# Patient Record
Sex: MALE | URBAN - METROPOLITAN AREA
[De-identification: names, ages, dates, MRNs, and addresses within clinical notes are randomized per-mention and may not be internally consistent; named-entity substitution may affect disease eponyms.]

---

## 2021-04-30 PROBLEM — Z00.00 ENCOUNTER FOR PREVENTIVE HEALTH EXAMINATION: Status: ACTIVE | Noted: 2021-04-30

## 2023-01-31 ENCOUNTER — OFFICE (OUTPATIENT)
Dept: URBAN - METROPOLITAN AREA CLINIC 112 | Facility: CLINIC | Age: 78
Setting detail: OPHTHALMOLOGY
End: 2023-01-31
Payer: MEDICARE

## 2023-01-31 DIAGNOSIS — H43.393: ICD-10-CM

## 2023-01-31 DIAGNOSIS — H35.40: ICD-10-CM

## 2023-01-31 DIAGNOSIS — H52.4: ICD-10-CM

## 2023-01-31 DIAGNOSIS — H25.13: ICD-10-CM

## 2023-01-31 PROCEDURE — 92004 COMPRE OPH EXAM NEW PT 1/>: CPT | Performed by: OPHTHALMOLOGY

## 2023-01-31 PROCEDURE — 92250 FUNDUS PHOTOGRAPHY W/I&R: CPT | Performed by: OPHTHALMOLOGY

## 2023-01-31 PROCEDURE — 92015 DETERMINE REFRACTIVE STATE: CPT | Performed by: OPHTHALMOLOGY

## 2023-01-31 ASSESSMENT — REFRACTION_MANIFEST
OS_CYLINDER: -1.25
OS_AXIS: 106
OD_ADD: +2.50
OS_VA1: 20/20-1
OD_VA1: 20/60
OD_SPHERE: -2.25
OD_AXIS: 124
OD_CYLINDER: -1.25
OS_SPHERE: -2.25
OS_ADD: +2.50

## 2023-01-31 ASSESSMENT — REFRACTION_CURRENTRX
OD_OVR_VA: 20/
OD_AXIS: 124
OS_SPHERE: -3.00
OD_VPRISM_DIRECTION: PROGS
OS_AXIS: 106
OS_VPRISM_DIRECTION: PROGS
OD_SPHERE: -2.50
OS_ADD: +3.00
OD_ADD: +2.00
OD_CYLINDER: -1.50
OS_OVR_VA: 20/
OS_CYLINDER: -1.00

## 2023-01-31 ASSESSMENT — CONFRONTATIONAL VISUAL FIELD TEST (CVF)
OD_FINDINGS: FULL
OS_FINDINGS: FULL

## 2023-01-31 ASSESSMENT — REFRACTION_AUTOREFRACTION
OD_AXIS: 119
OS_AXIS: 104
OD_CYLINDER: -1.00
OS_CYLINDER: -1.25
OS_SPHERE: -2.25
OD_SPHERE: -2.25

## 2023-01-31 ASSESSMENT — SPHEQUIV_DERIVED
OD_SPHEQUIV: -2.875
OS_SPHEQUIV: -2.875
OD_SPHEQUIV: -2.75
OS_SPHEQUIV: -2.875

## 2023-01-31 ASSESSMENT — TONOMETRY
OD_IOP_MMHG: 15
OS_IOP_MMHG: 16

## 2023-03-23 ENCOUNTER — APPOINTMENT (OUTPATIENT)
Dept: UROLOGY | Facility: CLINIC | Age: 78
End: 2023-03-23

## 2023-03-25 ENCOUNTER — INPATIENT (INPATIENT)
Facility: HOSPITAL | Age: 78
LOS: 8 days | Discharge: NURSING, REHAB OR LONG-TERM CARE | DRG: 689 | End: 2023-04-03
Attending: FAMILY MEDICINE | Admitting: INTERNAL MEDICINE
Payer: MEDICARE

## 2023-03-25 VITALS
RESPIRATION RATE: 17 BRPM | SYSTOLIC BLOOD PRESSURE: 148 MMHG | OXYGEN SATURATION: 100 % | HEART RATE: 69 BPM | TEMPERATURE: 98 F | DIASTOLIC BLOOD PRESSURE: 65 MMHG

## 2023-03-25 DIAGNOSIS — I50.32 CHRONIC DIASTOLIC (CONGESTIVE) HEART FAILURE: ICD-10-CM

## 2023-03-25 DIAGNOSIS — I48.20 CHRONIC ATRIAL FIBRILLATION, UNSPECIFIED: ICD-10-CM

## 2023-03-25 DIAGNOSIS — G93.41 METABOLIC ENCEPHALOPATHY: ICD-10-CM

## 2023-03-25 DIAGNOSIS — L03.115 CELLULITIS OF RIGHT LOWER LIMB: ICD-10-CM

## 2023-03-25 DIAGNOSIS — I82.463 ACUTE EMBOLISM AND THROMBOSIS OF CALF MUSCULAR VEIN, BILATERAL: ICD-10-CM

## 2023-03-25 DIAGNOSIS — R91.1 SOLITARY PULMONARY NODULE: ICD-10-CM

## 2023-03-25 DIAGNOSIS — N13.6 PYONEPHROSIS: ICD-10-CM

## 2023-03-25 DIAGNOSIS — N18.9 CHRONIC KIDNEY DISEASE, UNSPECIFIED: ICD-10-CM

## 2023-03-25 DIAGNOSIS — F03.90 UNSPECIFIED DEMENTIA WITHOUT BEHAVIORAL DISTURBANCE: ICD-10-CM

## 2023-03-25 DIAGNOSIS — Z91.81 HISTORY OF FALLING: ICD-10-CM

## 2023-03-25 DIAGNOSIS — I35.0 NONRHEUMATIC AORTIC (VALVE) STENOSIS: ICD-10-CM

## 2023-03-25 DIAGNOSIS — Z79.01 LONG TERM (CURRENT) USE OF ANTICOAGULANTS: ICD-10-CM

## 2023-03-25 DIAGNOSIS — N39.0 URINARY TRACT INFECTION, SITE NOT SPECIFIED: ICD-10-CM

## 2023-03-25 DIAGNOSIS — E43 UNSPECIFIED SEVERE PROTEIN-CALORIE MALNUTRITION: ICD-10-CM

## 2023-03-25 DIAGNOSIS — L60.3 NAIL DYSTROPHY: ICD-10-CM

## 2023-03-25 DIAGNOSIS — I24.8 OTHER FORMS OF ACUTE ISCHEMIC HEART DISEASE: ICD-10-CM

## 2023-03-25 DIAGNOSIS — B35.1 TINEA UNGUIUM: ICD-10-CM

## 2023-03-25 PROCEDURE — 87635 SARS-COV-2 COVID-19 AMP PRB: CPT

## 2023-03-25 PROCEDURE — 84100 ASSAY OF PHOSPHORUS: CPT

## 2023-03-25 PROCEDURE — 76770 US EXAM ABDO BACK WALL COMP: CPT

## 2023-03-25 PROCEDURE — 74176 CT ABD & PELVIS W/O CONTRAST: CPT

## 2023-03-25 PROCEDURE — 99285 EMERGENCY DEPT VISIT HI MDM: CPT

## 2023-03-25 PROCEDURE — 93970 EXTREMITY STUDY: CPT

## 2023-03-25 PROCEDURE — 86803 HEPATITIS C AB TEST: CPT

## 2023-03-25 PROCEDURE — 85027 COMPLETE CBC AUTOMATED: CPT

## 2023-03-25 PROCEDURE — 36415 COLL VENOUS BLD VENIPUNCTURE: CPT

## 2023-03-25 PROCEDURE — 0241U: CPT

## 2023-03-25 PROCEDURE — 93010 ELECTROCARDIOGRAM REPORT: CPT

## 2023-03-25 PROCEDURE — 71045 X-RAY EXAM CHEST 1 VIEW: CPT | Mod: 26

## 2023-03-25 PROCEDURE — 83735 ASSAY OF MAGNESIUM: CPT

## 2023-03-25 PROCEDURE — 80053 COMPREHEN METABOLIC PANEL: CPT

## 2023-03-25 PROCEDURE — 85014 HEMATOCRIT: CPT

## 2023-03-25 PROCEDURE — 97116 GAIT TRAINING THERAPY: CPT | Mod: GP

## 2023-03-25 PROCEDURE — 85025 COMPLETE CBC W/AUTO DIFF WBC: CPT

## 2023-03-25 PROCEDURE — 80048 BASIC METABOLIC PNL TOTAL CA: CPT

## 2023-03-25 PROCEDURE — 85610 PROTHROMBIN TIME: CPT

## 2023-03-25 PROCEDURE — 97530 THERAPEUTIC ACTIVITIES: CPT | Mod: GP

## 2023-03-25 PROCEDURE — 85018 HEMOGLOBIN: CPT

## 2023-03-25 PROCEDURE — 93306 TTE W/DOPPLER COMPLETE: CPT

## 2023-03-25 PROCEDURE — 83880 ASSAY OF NATRIURETIC PEPTIDE: CPT

## 2023-03-25 PROCEDURE — 80202 ASSAY OF VANCOMYCIN: CPT

## 2023-03-25 PROCEDURE — 97162 PT EVAL MOD COMPLEX 30 MIN: CPT | Mod: GP

## 2023-03-25 PROCEDURE — 70450 CT HEAD/BRAIN W/O DYE: CPT | Mod: 26

## 2023-03-25 PROCEDURE — 71045 X-RAY EXAM CHEST 1 VIEW: CPT

## 2023-03-25 RX ORDER — LEVOTHYROXINE SODIUM 125 MCG
1 TABLET ORAL
Refills: 0 | DISCHARGE

## 2023-03-25 RX ORDER — SPIRONOLACTONE 25 MG/1
1 TABLET, FILM COATED ORAL
Refills: 0 | DISCHARGE

## 2023-03-25 RX ORDER — FUROSEMIDE 40 MG
1 TABLET ORAL
Refills: 0 | DISCHARGE

## 2023-03-25 RX ORDER — WARFARIN SODIUM 2.5 MG/1
1 TABLET ORAL
Refills: 0 | DISCHARGE

## 2023-03-25 NOTE — PHARMACOTHERAPY INTERVENTION NOTE - COMMENTS
Medication history complete. Medications and allergies reviewed with patient's daughter at bedside and confirmed with .

## 2023-03-26 LAB
ALBUMIN SERPL ELPH-MCNC: 3.4 G/DL — SIGNIFICANT CHANGE UP (ref 3.3–5)
ALP SERPL-CCNC: 88 U/L — SIGNIFICANT CHANGE UP (ref 40–120)
ALT FLD-CCNC: 55 U/L — SIGNIFICANT CHANGE UP (ref 12–78)
ANION GAP SERPL CALC-SCNC: 5 MMOL/L — SIGNIFICANT CHANGE UP (ref 5–17)
AST SERPL-CCNC: 39 U/L — HIGH (ref 15–37)
BILIRUB SERPL-MCNC: 0.9 MG/DL — SIGNIFICANT CHANGE UP (ref 0.2–1.2)
BUN SERPL-MCNC: 101 MG/DL — HIGH (ref 7–23)
CALCIUM SERPL-MCNC: 8.8 MG/DL — SIGNIFICANT CHANGE UP (ref 8.5–10.1)
CHLORIDE SERPL-SCNC: 103 MMOL/L — SIGNIFICANT CHANGE UP (ref 96–108)
CO2 SERPL-SCNC: 31 MMOL/L — SIGNIFICANT CHANGE UP (ref 22–31)
CREAT SERPL-MCNC: 1.82 MG/DL — HIGH (ref 0.5–1.3)
EGFR: 38 ML/MIN/1.73M2 — LOW
FLUAV AG NPH QL: SIGNIFICANT CHANGE UP
FLUBV AG NPH QL: SIGNIFICANT CHANGE UP
GLUCOSE SERPL-MCNC: 172 MG/DL — HIGH (ref 70–99)
HCT VFR BLD CALC: 41.7 % — SIGNIFICANT CHANGE UP (ref 39–50)
HGB BLD-MCNC: 13.7 G/DL — SIGNIFICANT CHANGE UP (ref 13–17)
INR BLD: 2.33 RATIO — HIGH (ref 0.88–1.16)
MCHC RBC-ENTMCNC: 27.5 PG — SIGNIFICANT CHANGE UP (ref 27–34)
MCHC RBC-ENTMCNC: 32.9 GM/DL — SIGNIFICANT CHANGE UP (ref 32–36)
MCV RBC AUTO: 83.6 FL — SIGNIFICANT CHANGE UP (ref 80–100)
PLATELET # BLD AUTO: 155 K/UL — SIGNIFICANT CHANGE UP (ref 150–400)
POTASSIUM SERPL-MCNC: 3.6 MMOL/L — SIGNIFICANT CHANGE UP (ref 3.5–5.3)
POTASSIUM SERPL-SCNC: 3.6 MMOL/L — SIGNIFICANT CHANGE UP (ref 3.5–5.3)
PROT SERPL-MCNC: 6.8 GM/DL — SIGNIFICANT CHANGE UP (ref 6–8.3)
PROTHROM AB SERPL-ACNC: 27.3 SEC — HIGH (ref 10.5–13.4)
RBC # BLD: 4.99 M/UL — SIGNIFICANT CHANGE UP (ref 4.2–5.8)
RBC # FLD: 14.1 % — SIGNIFICANT CHANGE UP (ref 10.3–14.5)
RSV RNA NPH QL NAA+NON-PROBE: SIGNIFICANT CHANGE UP
SARS-COV-2 RNA SPEC QL NAA+PROBE: SIGNIFICANT CHANGE UP
SODIUM SERPL-SCNC: 139 MMOL/L — SIGNIFICANT CHANGE UP (ref 135–145)
WBC # BLD: 19.47 K/UL — HIGH (ref 3.8–10.5)
WBC # FLD AUTO: 19.47 K/UL — HIGH (ref 3.8–10.5)

## 2023-03-26 PROCEDURE — 12345: CPT | Mod: NC,GC

## 2023-03-26 PROCEDURE — 93970 EXTREMITY STUDY: CPT | Mod: 26

## 2023-03-26 PROCEDURE — 99222 1ST HOSP IP/OBS MODERATE 55: CPT

## 2023-03-26 RX ORDER — WARFARIN SODIUM 2.5 MG/1
2.5 TABLET ORAL DAILY
Refills: 0 | Status: DISCONTINUED | OUTPATIENT
Start: 2023-03-26 | End: 2023-03-28

## 2023-03-26 RX ORDER — ONDANSETRON 8 MG/1
4 TABLET, FILM COATED ORAL EVERY 8 HOURS
Refills: 0 | Status: DISCONTINUED | OUTPATIENT
Start: 2023-03-26 | End: 2023-04-03

## 2023-03-26 RX ORDER — LEVOTHYROXINE SODIUM 125 MCG
50 TABLET ORAL DAILY
Refills: 0 | Status: DISCONTINUED | OUTPATIENT
Start: 2023-03-26 | End: 2023-04-03

## 2023-03-26 RX ORDER — CEFTRIAXONE 500 MG/1
1000 INJECTION, POWDER, FOR SOLUTION INTRAMUSCULAR; INTRAVENOUS EVERY 24 HOURS
Refills: 0 | Status: DISCONTINUED | OUTPATIENT
Start: 2023-03-26 | End: 2023-03-26

## 2023-03-26 RX ORDER — ACETAMINOPHEN 500 MG
650 TABLET ORAL EVERY 6 HOURS
Refills: 0 | Status: DISCONTINUED | OUTPATIENT
Start: 2023-03-26 | End: 2023-04-03

## 2023-03-26 RX ORDER — CEFTRIAXONE 500 MG/1
1000 INJECTION, POWDER, FOR SOLUTION INTRAMUSCULAR; INTRAVENOUS EVERY 24 HOURS
Refills: 0 | Status: DISCONTINUED | OUTPATIENT
Start: 2023-03-26 | End: 2023-04-03

## 2023-03-26 RX ORDER — LANOLIN ALCOHOL/MO/W.PET/CERES
3 CREAM (GRAM) TOPICAL AT BEDTIME
Refills: 0 | Status: DISCONTINUED | OUTPATIENT
Start: 2023-03-26 | End: 2023-04-03

## 2023-03-26 RX ORDER — FUROSEMIDE 40 MG
40 TABLET ORAL ONCE
Refills: 0 | Status: COMPLETED | OUTPATIENT
Start: 2023-03-26 | End: 2023-03-26

## 2023-03-26 RX ADMIN — WARFARIN SODIUM 2.5 MILLIGRAM(S): 2.5 TABLET ORAL at 21:56

## 2023-03-26 RX ADMIN — CEFTRIAXONE 1000 MILLIGRAM(S): 500 INJECTION, POWDER, FOR SOLUTION INTRAMUSCULAR; INTRAVENOUS at 00:31

## 2023-03-26 RX ADMIN — Medication 3 MILLIGRAM(S): at 23:48

## 2023-03-26 RX ADMIN — Medication 50 MICROGRAM(S): at 05:28

## 2023-03-26 RX ADMIN — Medication 40 MILLIGRAM(S): at 01:48

## 2023-03-26 NOTE — ED ADULT NURSE NOTE - NSICDXNOFAMILYHX_GEN_ALL_CORE
Called and spoke to [facility] in lab who is aware of add-on urine orders and who states Protime-INR already completed and resulted, does not need redrawn.       Estevan Dhillon RN  07/08/20 1236 <-- Click to add NO pertinent Family History

## 2023-03-26 NOTE — H&P ADULT - NSHPPHYSICALEXAM_GEN_ALL_CORE
T(C): --97.9 F  HR: --69  BP: --148/65  RR: --17  SpO2: --100 % in RA    CONSTITUTIONAL no apparent distress, well developed.   EYES: PERRLA and symmetric, EOMI, No conjunctival or scleral injection, non-icteric  ENMT: Oral mucosa with moist membranes. Normal dentition; no pharyngeal injection or exudates             NECK: Supple, symmetric and without tracheal deviation   RESP: No respiratory distress, no use of accessory muscles; CTA b/l, no WRR  CV: RRR, +S1S2, no MRG; no JVD; ++ peripheral edema  GI: Soft, NT, ND, no rebound, no guarding; no palpable masses; no hepatosplenomegaly; no hernia palpated  LYMPH: No cervical LAD or tenderness.   MSK: Normal ROM without pain, no spinal tenderness, normal muscle strength/tone  SKIN: No rashes or ulcers noted; no subcutaneous nodules or induration palpable  NEURO: CN II-XII intact; normal reflexes in upper and lower extremities, sensation intact in upper and lower extremities b/l to light touch   PSYCH: Appropriate insight/judgment; A+O x 3, mood and affect appropriate, recent/remote memory intact
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
## 2023-03-26 NOTE — H&P ADULT - HISTORY OF PRESENT ILLNESS
76 yo Male presented with change in mental status, weakness and increased urination for 3-4 days. Patient has dementia so he is not a good historian. Daughter at the bedside provided the history. As per the Daughter, patient lives alone. She went to check on him last 2-3 days and found out he was getting more confuse and weak over the last few days. He was not taking his medications  regularly and was not able to control his urine. He was also having difficulty to move around. So yesterday daughter went to see him again and called 911 as his condition continued to get worse. No recent travel and no sick contact.  As per the daughter his legs are edematous. in last 2-3 days as he is not taking his meds regularly.     PMHx:  CKD  CHF  A fib  Dementia     PSHx:  No recent surgeries.
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
## 2023-03-26 NOTE — H&P ADULT - REASON FOR ADMISSION
Change in mental status Pt here for evaluation of right thumb / cuticle infection. States he was trimming nails and worked on cuticle. Area has been getting red, swollen, and drainage today when pressed. Last Td was 11/2020  Visit Vitals  /78   Pulse (!) 59   Temp 97.8 °F (36.6 °C) (Tympanic)   Resp 18   SpO2 98%

## 2023-03-26 NOTE — PROGRESS NOTE ADULT - ATTENDING COMMENTS
77 year old male with a PMH of CHF, CKD, Afib and Dementia presented with change in mental status, weakness and increased urination for 3-4 days.       #RLE edema and erythema  #b/l LE DVTs  -Pt with history of CHF with worsening lower extremity edema  -no SOB or chest pain  -On furosemide 40 daily at home and spironolactone  -s/p IV lasix 40 on admission  -On physical exam: + RLE erythema, 2+ pitting edema with wrinkling with b/l lower extremity TTP, +onychomycosis, suspect RLE cellulitis  -On IV ceftriaxone, will monitor for worsening symptoms   -WBC 17.92 > 19.47, negative SIRS; lactacte WNL  -US doppler done for r/o of DVT: + extensive occlusive DVTs in both calves with b/l superficial venous thrombosis in greater saphenous   -Patient on outpatient coumadin; compliance unknown, will f/u with daughter (varun) for more information tomorrow  -Following with Cardiologist Dr. Nesbitt as outpatient  -c/w Coumadin 2.5 mg QD; Monitor INR levels  -Vascular consult placed  -TTE pending   -daily weights  -Strict I&Os

## 2023-03-26 NOTE — H&P ADULT - ASSESSMENT
A/P:    1.  AMS  UTI  -started on IV ceftriaxone  -follow clinically and cultures    2.  h/o CHF  -worsening leg edema  -patient was not compliant with meds  -will give IV lasix 40 mg one dose now  -will follow Echo  -follow clinically and Cr level to give further dose of diuretics  -daily weight  -intake and output measure    3.  h/o CKD  -follow Cr level    4.  h/o A fib  -HR controlled  -continue Coumadin  -follow INR and adjust meds    5.  Coumadin for DVT ppx    6.  Code status; Full code.  A/P:    1.  AMS  UTI  -started on IV ceftriaxone  -follow clinically and cultures    2.  h/o CHF  -no SOB, no chest pain   -worsening leg edema  -patient was not compliant with meds  -will give IV lasix 40 mg one dose now  -will follow Echo  -follow clinically and Cr level to give further dose of diuretics  -daily weight  -intake and output measure    3.  h/o CKD  -follow Cr level    4.  h/o A fib  -HR controlled  -continue Coumadin  -follow INR and adjust meds    5.  Coumadin for DVT ppx    6.  Code status; Full code.

## 2023-03-26 NOTE — CONSULT NOTE ADULT - SUBJECTIVE AND OBJECTIVE BOX
Date of service: 3/26/23  HPI: 77y year old Male seen at bedside with a chief complaint of  painful thickened, elongated and dystrophic toenails digits 1-5 bilaterally and preventative foot examination. Patient is medically managed  by Medicine/Hospitalists.  Patient denies any history of trauma to both feet.  Patient denies any additional pedal complaints at this time.  Patient is experiencing pain while standing, walking and in shoe gear.     ROS:  Patient admits to  (-) Fevers, (-) Chills, (-) Nausea, (-) Vomiting, (-) Shortness of Breath (-) calf pain (-) chest pain       PMH:   No pertinent past medical history      PSH:    Allergies:No Known Allergies      Labs:                        13.7   19.47 )-----------( 155      ( 26 Mar 2023 08:46 )             41.7       WBC Trend  19.47<H> Date (03-26 @ 08:46)  17.92<H> Date (03-25 @ 22:15)      Chem  03-26    139  |  103  |  101<H>  ----------------------------<  172<H>  3.6   |  31  |  1.82<H>    Ca    8.8      26 Mar 2023 08:46    TPro  6.8  /  Alb  3.4  /  TBili  0.9  /  DBili  x   /  AST  39<H>  /  ALT  55  /  AlkPhos  88  03-26        Vitals:  T(F): 97.3 (03-26-23 @ 14:11), Max: 98.4 (03-26-23 @ 01:06)  HR: 77 (03-26-23 @ 14:11) (69 - 84)  BP: 135/78 (03-26-23 @ 14:11) (126/76 - 148/65)  RR: 17 (03-26-23 @ 14:11) (15 - 19)  SpO2: 100% (03-26-23 @ 14:11) (99% - 100%)  Wt(kg): --    Physical Examination:  Constitutional: Alert, oriented x3, in NAD.  Focused LE exam:  Integument:  Skin warm, dry and supple bilateral.  No open lesions or inter-digital macerations, and no clinical signs of acute infection noted bilaterally.   Toenails 1-5 Right and Left feet thickened, elongated, discolored, and dystrophic with subungual debris. There is pain upon palpation of all fungal and ingrowing nails 1-5 bilaterally.   Vascular: Temperature gradient is warm to warm b/l. Palpable pulses, Dorsalis Pedis and Posterior Tibial pulses -/4 b/l.  Capillary re-fill time less than 3 seconds digits 1-5 bilateral.   Neuro: Protective sensation intact to the level of the digits 1-5 bilaterally.  MSK: Manual muscle strength testing  5/5 all major muscle groups foot/ankle bilaterally. ROM of all foot/ankle joints is WNL bilaterally. No structural abnormality, bilaterally

## 2023-03-26 NOTE — CONSULT NOTE ADULT - SUBJECTIVE AND OBJECTIVE BOX
HPI:  78yo M w/ CHF, CKD, afib (on coumadin), dementia presents w/ AMS, weakness. poor historian. Per chart, patient lives alone and is taken care by daughter at night. Patient states that he ambulates at home. 2 days ago when daughter visited the patient, he was more confused and weak than usual, unsure if patient took medications routinely. Patient is on comadin for afib. Vascular surgery consulted for venous duplex finding of b/l lower extremity DVT below knee. Patient has swelling of lower extremities. Denies fever/chills, shortness of breath, chest pain. VS reviewed    PAST MEDICAL & SURGICAL HISTORY:  No pertinent past medical history          MEDICATIONS  (STANDING):  cefTRIAXone Injectable. 1000 milliGRAM(s) IV Push every 24 hours  levothyroxine 50 MICROGram(s) Oral daily  warfarin 2.5 milliGRAM(s) Oral daily    MEDICATIONS  (PRN):  acetaminophen     Tablet .. 650 milliGRAM(s) Oral every 6 hours PRN Temp greater or equal to 38C (100.4F), Mild Pain (1 - 3)  aluminum hydroxide/magnesium hydroxide/simethicone Suspension 30 milliLiter(s) Oral every 4 hours PRN Dyspepsia  melatonin 3 milliGRAM(s) Oral at bedtime PRN Insomnia  ondansetron Injectable 4 milliGRAM(s) IV Push every 8 hours PRN Nausea and/or Vomiting      Allergies    No Known Allergies    Intolerances        SOCIAL HISTORY:    FAMILY HISTORY:  No pertinent family history in first degree relatives            Physical Exam:  GENERAL: NAD, AOx3, well developed  HEAD: Atraumatic, normocephalic  EYES: EOMI, PERRLA, conjunctiva and sclera clear  ENT: moist mucous membrane  NECK: supple, No JVD, midline trachea  CHEST/LUNG: unlabored respirations  Heart: S1, S2 normal  ABDOMEN: soft, nondistended, nontender  EXTREMITIES: b/l lower extremity pitting edema, Cesar's sign positive  MSK: full ROM, no deformities  SKIN: warm to touch, no rash or lesions        Vital Signs Last 24 Hrs  T(C): 36.3 (26 Mar 2023 14:11), Max: 36.9 (26 Mar 2023 01:06)  T(F): 97.3 (26 Mar 2023 14:11), Max: 98.4 (26 Mar 2023 01:06)  HR: 77 (26 Mar 2023 14:11) (69 - 84)  BP: 135/78 (26 Mar 2023 14:11) (126/76 - 148/65)  BP(mean): 86 (26 Mar 2023 01:55) (84 - 87)  RR: 17 (26 Mar 2023 14:11) (15 - 19)  SpO2: 100% (26 Mar 2023 14:11) (99% - 100%)    Parameters below as of 26 Mar 2023 14:11  Patient On (Oxygen Delivery Method): room air        I&O's Summary          LABS:                        13.7   19.47 )-----------( 155      ( 26 Mar 2023 08:46 )             41.7         139  |  103  |  101<H>  ----------------------------<  172<H>  3.6   |  31  |  1.82<H>    Ca    8.8      26 Mar 2023 08:46    TPro  6.8  /  Alb  3.4  /  TBili  0.9  /  DBili  x   /  AST  39<H>  /  ALT  55  /  AlkPhos  88  03-    PT/INR - ( 26 Mar 2023 08:46 )   PT: 27.3 sec;   INR: 2.33 ratio         PTT - ( 25 Mar 2023 22:15 )  PTT:43.7 sec  Urinalysis Basic - ( 25 Mar 2023 22:15 )    Color: Sherin / Appearance: Clear / S.010 / pH: x  Gluc: x / Ketone: Trace  / Bili: Negative / Urobili: Negative   Blood: x / Protein: 100 / Nitrite: Positive   Leuk Esterase: Trace / RBC: 11-25 /HPF / WBC 6-10 /HPF   Sq Epi: x / Non Sq Epi: Moderate / Bacteria: Moderate      CAPILLARY BLOOD GLUCOSE        LIVER FUNCTIONS - ( 26 Mar 2023 08:46 )  Alb: 3.4 g/dL / Pro: 6.8 gm/dL / ALK PHOS: 88 U/L / ALT: 55 U/L / AST: 39 U/L / GGT: x             Cultures:      RADIOLOGY & ADDITIONAL STUDIES:

## 2023-03-26 NOTE — ED ADULT NURSE NOTE - OBJECTIVE STATEMENT
Pt came into the ED with c/o AMSx4 days. Pt seen by MD lucero. Pt's daughter reports the pt has been more confused. Pt is A&ox2.

## 2023-03-27 DIAGNOSIS — I35.0 NONRHEUMATIC AORTIC (VALVE) STENOSIS: ICD-10-CM

## 2023-03-27 LAB
ANION GAP SERPL CALC-SCNC: 4 MMOL/L — LOW (ref 5–17)
BUN SERPL-MCNC: 78 MG/DL — HIGH (ref 7–23)
CALCIUM SERPL-MCNC: 8.6 MG/DL — SIGNIFICANT CHANGE UP (ref 8.5–10.1)
CHLORIDE SERPL-SCNC: 107 MMOL/L — SIGNIFICANT CHANGE UP (ref 96–108)
CO2 SERPL-SCNC: 29 MMOL/L — SIGNIFICANT CHANGE UP (ref 22–31)
CREAT SERPL-MCNC: 1.78 MG/DL — HIGH (ref 0.5–1.3)
EGFR: 39 ML/MIN/1.73M2 — LOW
GLUCOSE SERPL-MCNC: 149 MG/DL — HIGH (ref 70–99)
HCT VFR BLD CALC: 41.6 % — SIGNIFICANT CHANGE UP (ref 39–50)
HCV AB S/CO SERPL IA: 0.28 S/CO — SIGNIFICANT CHANGE UP (ref 0–0.99)
HCV AB SERPL-IMP: SIGNIFICANT CHANGE UP
HGB BLD-MCNC: 13.6 G/DL — SIGNIFICANT CHANGE UP (ref 13–17)
INR BLD: 1.83 RATIO — HIGH (ref 0.88–1.16)
MCHC RBC-ENTMCNC: 27.3 PG — SIGNIFICANT CHANGE UP (ref 27–34)
MCHC RBC-ENTMCNC: 32.7 GM/DL — SIGNIFICANT CHANGE UP (ref 32–36)
MCV RBC AUTO: 83.5 FL — SIGNIFICANT CHANGE UP (ref 80–100)
PLATELET # BLD AUTO: 171 K/UL — SIGNIFICANT CHANGE UP (ref 150–400)
POTASSIUM SERPL-MCNC: 3.7 MMOL/L — SIGNIFICANT CHANGE UP (ref 3.5–5.3)
POTASSIUM SERPL-SCNC: 3.7 MMOL/L — SIGNIFICANT CHANGE UP (ref 3.5–5.3)
PROTHROM AB SERPL-ACNC: 21.4 SEC — HIGH (ref 10.5–13.4)
RBC # BLD: 4.98 M/UL — SIGNIFICANT CHANGE UP (ref 4.2–5.8)
RBC # FLD: 14.2 % — SIGNIFICANT CHANGE UP (ref 10.3–14.5)
SODIUM SERPL-SCNC: 140 MMOL/L — SIGNIFICANT CHANGE UP (ref 135–145)
WBC # BLD: 18.35 K/UL — HIGH (ref 3.8–10.5)
WBC # FLD AUTO: 18.35 K/UL — HIGH (ref 3.8–10.5)

## 2023-03-27 PROCEDURE — 93306 TTE W/DOPPLER COMPLETE: CPT | Mod: 26

## 2023-03-27 PROCEDURE — 99223 1ST HOSP IP/OBS HIGH 75: CPT

## 2023-03-27 PROCEDURE — 99233 SBSQ HOSP IP/OBS HIGH 50: CPT | Mod: GC

## 2023-03-27 RX ADMIN — CEFTRIAXONE 1000 MILLIGRAM(S): 500 INJECTION, POWDER, FOR SOLUTION INTRAMUSCULAR; INTRAVENOUS at 00:21

## 2023-03-27 RX ADMIN — Medication 50 MICROGRAM(S): at 05:25

## 2023-03-27 NOTE — CONSULT NOTE ADULT - PROBLEM SELECTOR PROBLEM 1
If patient is having chest pain, she needs to go to the ER ASAP. Please call patient and see how she is feeling. Does she have a fever? If patient believes that chest congestion is related to URI, She needs to call hotline given symptoms. Severe aortic stenosis

## 2023-03-27 NOTE — CONSULT NOTE ADULT - SUBJECTIVE AND OBJECTIVE BOX
HPI:  76 yo Male presented with change in mental status, weakness and increased urination for 3-4 days. Patient has dementia so he is not a good historian. Daughter at the bedside provided the history. As per the Daughter, patient lives alone. She went to check on him last 2-3 days and found out he was getting more confuse and weak over the last few days. He was not taking his medications  regularly and was not able to control his urine. He was also having difficulty to move around. So yesterday daughter went to see him again and called 911 as his condition continued to get worse. No recent travel and no sick contact.  As per the daughter his legs are edematous. in last 2-3 days as he is not taking his meds regularly.     PMHx:  CKD  CHF  A fib  Dementia     PSHx:  No recent surgeries.  (26 Mar 2023 00:41)    admitted for UTI, AMS.  LE edema w/ h/o CHF was also noted by admitting MDs.  Pt is poor historian 2/2 dementia  Spoke w/ daughter. Pt has a known h/o severe aortic stenosis   followed by Dr. [facility].  Scheduled for evaluation by CT surgery this week.  Reviewed echo - AS in severe range.  Discussed w/ Cardiologist on call w/ Dr. Skaggs's practice  Confirmed AS values were similar.      ALLERGIES:  Allergies    No Known Allergies    Intolerances        SOCIAL HISTORY:  Social History:  Lives alone at home.  Non smoker.  Non alcoholic. (26 Mar 2023 00:41)      FAMILY  HISTORY:  FAMILY HISTORY:  No pertinent family history in first degree relatives        MEDICATIONS:  OUTPATIENT:  Home Medications:  furosemide 40 mg oral tablet: 1 orally once a day (25 Mar 2023 23:56)  levothyroxine 50 mcg (0.05 mg) oral tablet: 1 orally once a day (25 Mar 2023 23:56)  Multiple Vitamins oral tablet: 1 orally once a day (25 Mar 2023 23:56)  spironolactone 25 mg oral tablet: 1 orally once a day (25 Mar 2023 23:56)  warfarin 2.5 mg oral tablet: 1 orally once a day (25 Mar 2023 23:56)      INPATIENT:  MEDICATIONS  (STANDING):  cefTRIAXone Injectable. 1000 milliGRAM(s) IV Push every 24 hours  levothyroxine 50 MICROGram(s) Oral daily  warfarin 2.5 milliGRAM(s) Oral daily    MEDICATIONS  (PRN):  acetaminophen     Tablet .. 650 milliGRAM(s) Oral every 6 hours PRN Temp greater or equal to 38C (100.4F), Mild Pain (1 - 3)  aluminum hydroxide/magnesium hydroxide/simethicone Suspension 30 milliLiter(s) Oral every 4 hours PRN Dyspepsia  melatonin 3 milliGRAM(s) Oral at bedtime PRN Insomnia  ondansetron Injectable 4 milliGRAM(s) IV Push every 8 hours PRN Nausea and/or Vomiting    MEDICATIONS  (PRN):  acetaminophen     Tablet .. 650 milliGRAM(s) Oral every 6 hours PRN Temp greater or equal to 38C (100.4F), Mild Pain (1 - 3)  aluminum hydroxide/magnesium hydroxide/simethicone Suspension 30 milliLiter(s) Oral every 4 hours PRN Dyspepsia  melatonin 3 milliGRAM(s) Oral at bedtime PRN Insomnia  ondansetron Injectable 4 milliGRAM(s) IV Push every 8 hours PRN Nausea and/or Vomiting      REVIEW OF SYSTEMS:  ===============================  ===============================  CONSTITUTIONAL: No weakness, fevers or chills  EYES/ENT: No visual changes;  No vertigo or throat pain   NECK: No pain or stiffness  RESPIRATORY: No cough, wheezing, hemoptysis; No shortness of breath  CARDIOVASCULAR: No chest pain or palpitations  GASTROINTESTINAL: No abdominal or epigastric pain. No nausea, vomiting, or hematemesis;   No diarrhea or constipation. No melena or hematochezia.  GENITOURINARY: No dysuria, frequency or hematuria  NEUROLOGICAL: No numbness or weakness  SKIN: No itching, burning, rashes, or lesions   All other review of systems is negative unless indicated above    Vital Signs Last 24 Hrs  T(C): 36.3 (27 Mar 2023 08:38), Max: 36.4 (27 Mar 2023 00:11)  T(F): 97.4 (27 Mar 2023 08:38), Max: 97.5 (27 Mar 2023 00:11)  HR: 78 (27 Mar 2023 08:38) (72 - 78)  BP: 149/76 (27 Mar 2023 08:38) (142/80 - 149/76)  BP(mean): --  RR: 17 (27 Mar 2023 08:38) (17 - 17)  SpO2: 97% (27 Mar 2023 08:38) (97% - 98%)    Parameters below as of 27 Mar 2023 08:38  Patient On (Oxygen Delivery Method): room air        I&O's Summary      I&O's Detail      PHYSICAL EXAM:    Constitutional: NAD, awake and alert, well-developed  HEENT: PERR, EOMI,  No oral cyananosis.  Neck:  supple,  No JVD  Respiratory: Breath sounds are clear bilaterally, No wheezing, rales or rhonchi  Cardiovascular: S1 and S2, regular rate and rhythm, 3/6 systolic murmu r  radiating to carotids. no gallops or rubs  Gastrointestinal: Bowel Sounds present, soft, nontender.   Extremities: mild peripheral edema. No clubbing or cyanosis.  Vascular: 2+ peripheral pulses  Neurological: A/O x 3, no focal deficits  Musculoskeletal: no calf tenderness.  Skin: No rashes.    ===============================  ===============================  LABS:                         13.6   18.35 )-----------( 171      ( 27 Mar 2023 07:39 )             41.6                         13.7   19.47 )-----------( 155      ( 26 Mar 2023 08:46 )             41.7                         13.9   17.92 )-----------( 166      ( 25 Mar 2023 22:15 )             42.7     27 Mar 2023 07:39    140    |  107    |  78     ----------------------------<  149    3.7     |  29     |  1.78   26 Mar 2023 08:46    139    |  103    |  101    ----------------------------<  172    3.6     |  31     |  1.82   25 Mar 2023 22:15    137    |  101    |  97     ----------------------------<  247    4.5     |  31     |  1.82     Ca    8.6        27 Mar 2023 07:39  Ca    8.8        26 Mar 2023 08:46  Ca    8.7        25 Mar 2023 22:15    TPro  6.8    /  Alb  3.4    /  TBili  0.9    /  DBili  x      /  AST  39     /  ALT  55     /  AlkPhos  88     26 Mar 2023 08:46  TPro  6.9    /  Alb  3.3    /  TBili  0.9    /  DBili  x      /  AST  74     /  ALT  66     /  AlkPhos  88     25 Mar 2023 22:15    PT/INR - ( 27 Mar 2023 07:39 )   PT: 21.4 sec;   INR: 1.83 ratio         PTT - ( 25 Mar 2023 22:15 )  PTT:43.7 sec    THYROID STUDIES:    ===============================  ===============================  CARDIAC BIOMARKERS:  -------  -BNP VALUES:    -------  -TROPONIN VALUES:   Troponin I, High Sensitivity Result: 31.25 ng/L (03-25-23 @ 22:15)     ===============================  ===============================  EKG: afib LVH no ischemic changes.    ===============================  ECHO:  Ecno prelim severe AS  Vmax 4.86 MPG 55 mmhg  velocity index= 0.16 nl EF    ===============================    Tuan Davis M.D.  Cardiology, [facility]  Cell: [phone]  Office:   [phone] ([facility] Office)   ([facility] Office)    ===============================
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
## 2023-03-27 NOTE — PHYSICAL THERAPY INITIAL EVALUATION ADULT - GENERAL OBSERVATIONS, REHAB EVAL
The pt was received on 2S, supine, intermittently confused and forgetful but willing to participate with PT.

## 2023-03-27 NOTE — PROGRESS NOTE ADULT - ASSESSMENT
A/P:  76yo M presents w/ AMS/weakness, lower extremity swelling  Vdup showed b/l below knee DVT  INR: 1.83    P;  -continue anticoagulation  -f/u Radiology and review Venous duplex  - vascular surgery will follow  -cont rest of management as primary team    Plan discussed with vascular surgery attending, Dr. Orellana   A/P:  76yo M presents w/ AMS/weakness, lower extremity swelling  Vdup showed b/l below knee DVT  INR: 1.83  unlikely to have acute bilateral below knee DVTs on supratherapeutic/therpeutic INR    P;  -continue anticoagulation, continue Coumadin  -need bilateral stockings  -cont rest of management as primary team  -pt should follow up with Dr. Morrison and the vascular team in the office after discharge    Plan discussed with Dr. Morrison

## 2023-03-27 NOTE — PROGRESS NOTE ADULT - SUBJECTIVE AND OBJECTIVE BOX
SURGERY DAILY PROGRESS NOTE:     Subjective:  Patient seen and examined at bedside during am rounds, no acute issues overnight. AVSS. Denies any fevers, chills, n/v/d, chest pain or shortness of breath    Objective:    MEDICATIONS  (STANDING):  cefTRIAXone Injectable. 1000 milliGRAM(s) IV Push every 24 hours  levothyroxine 50 MICROGram(s) Oral daily  warfarin 2.5 milliGRAM(s) Oral daily    MEDICATIONS  (PRN):  acetaminophen     Tablet .. 650 milliGRAM(s) Oral every 6 hours PRN Temp greater or equal to 38C (100.4F), Mild Pain (1 - 3)  aluminum hydroxide/magnesium hydroxide/simethicone Suspension 30 milliLiter(s) Oral every 4 hours PRN Dyspepsia  melatonin 3 milliGRAM(s) Oral at bedtime PRN Insomnia  ondansetron Injectable 4 milliGRAM(s) IV Push every 8 hours PRN Nausea and/or Vomiting      Vital Signs Last 24 Hrs  T(C): 36.3 (27 Mar 2023 08:38), Max: 36.4 (27 Mar 2023 00:11)  T(F): 97.4 (27 Mar 2023 08:38), Max: 97.5 (27 Mar 2023 00:11)  HR: 78 (27 Mar 2023 08:38) (72 - 78)  BP: 149/76 (27 Mar 2023 08:38) (135/78 - 149/76)  BP(mean): --  RR: 17 (27 Mar 2023 08:38) (17 - 17)  SpO2: 97% (27 Mar 2023 08:38) (97% - 100%)    Parameters below as of 27 Mar 2023 08:38  Patient On (Oxygen Delivery Method): room air          PHYSICAL EXAM   GENERAL: NAD, AOx3, well developed  HEAD: Atraumatic, normocephalic  EYES: EOMI, PERRLA, conjunctiva and sclera clear  ENT: moist mucous membrane  NECK: supple, No JVD, midline trachea  CHEST/LUNG: unlabored respirations  Heart: S1, S2 normal  ABDOMEN: soft, nondistended, nontender  EXTREMITIES: b/l lower extremity pitting edema, Cesar's sign positive  MSK: full ROM, no deformities  SKIN: warm to touch, no rash or lesions    I&O's Detail      Daily     Daily     LABS:                        13.6   18.35 )-----------( 171      ( 27 Mar 2023 07:39 )             41.6     03    140  |  107  |  78<H>  ----------------------------<  149<H>  3.7   |  29  |  1.78<H>    Ca    8.6      27 Mar 2023 07:39    TPro  6.8  /  Alb  3.4  /  TBili  0.9  /  DBili  x   /  AST  39<H>  /  ALT  55  /  AlkPhos  88  03-    PT/INR - ( 27 Mar 2023 07:39 )   PT: 21.4 sec;   INR: 1.83 ratio         PTT - ( 25 Mar 2023 22:15 )  PTT:43.7 sec  Urinalysis Basic - ( 25 Mar 2023 22:15 )    Color: Sherin / Appearance: Clear / S.010 / pH: x  Gluc: x / Ketone: Trace  / Bili: Negative / Urobili: Negative   Blood: x / Protein: 100 / Nitrite: Positive   Leuk Esterase: Trace / RBC: 11-25 /HPF / WBC 6-10 /HPF   Sq Epi: x / Non Sq Epi: Moderate / Bacteria: Moderate        RADIOLOGY & ADDITIONAL STUDIES:    ASSESSMENT/PLAN:

## 2023-03-28 ENCOUNTER — APPOINTMENT (OUTPATIENT)
Dept: CARDIOTHORACIC SURGERY | Facility: CLINIC | Age: 78
End: 2023-03-28

## 2023-03-28 LAB
ALBUMIN SERPL ELPH-MCNC: 3.2 G/DL — LOW (ref 3.3–5)
ALP SERPL-CCNC: 91 U/L — SIGNIFICANT CHANGE UP (ref 40–120)
ALT FLD-CCNC: 79 U/L — HIGH (ref 12–78)
ANION GAP SERPL CALC-SCNC: 2 MMOL/L — LOW (ref 5–17)
AST SERPL-CCNC: 48 U/L — HIGH (ref 15–37)
BASOPHILS # BLD AUTO: 0.03 K/UL — SIGNIFICANT CHANGE UP (ref 0–0.2)
BASOPHILS NFR BLD AUTO: 0.2 % — SIGNIFICANT CHANGE UP (ref 0–2)
BILIRUB SERPL-MCNC: 0.6 MG/DL — SIGNIFICANT CHANGE UP (ref 0.2–1.2)
BUN SERPL-MCNC: 54 MG/DL — HIGH (ref 7–23)
CALCIUM SERPL-MCNC: 9.3 MG/DL — SIGNIFICANT CHANGE UP (ref 8.5–10.1)
CHLORIDE SERPL-SCNC: 108 MMOL/L — SIGNIFICANT CHANGE UP (ref 96–108)
CO2 SERPL-SCNC: 33 MMOL/L — HIGH (ref 22–31)
CREAT SERPL-MCNC: 1.37 MG/DL — HIGH (ref 0.5–1.3)
EGFR: 53 ML/MIN/1.73M2 — LOW
EOSINOPHIL # BLD AUTO: 0.25 K/UL — SIGNIFICANT CHANGE UP (ref 0–0.5)
EOSINOPHIL NFR BLD AUTO: 1.4 % — SIGNIFICANT CHANGE UP (ref 0–6)
GLUCOSE SERPL-MCNC: 162 MG/DL — HIGH (ref 70–99)
HCT VFR BLD CALC: 42.2 % — SIGNIFICANT CHANGE UP (ref 39–50)
HCT VFR BLD CALC: 43.6 % — SIGNIFICANT CHANGE UP (ref 39–50)
HGB BLD-MCNC: 13.6 G/DL — SIGNIFICANT CHANGE UP (ref 13–17)
HGB BLD-MCNC: 14.1 G/DL — SIGNIFICANT CHANGE UP (ref 13–17)
IMM GRANULOCYTES NFR BLD AUTO: 1 % — HIGH (ref 0–0.9)
INR BLD: 1.7 RATIO — HIGH (ref 0.88–1.16)
LYMPHOCYTES # BLD AUTO: 0.73 K/UL — LOW (ref 1–3.3)
LYMPHOCYTES # BLD AUTO: 4 % — LOW (ref 13–44)
MAGNESIUM SERPL-MCNC: 2.8 MG/DL — HIGH (ref 1.6–2.6)
MCHC RBC-ENTMCNC: 27.3 PG — SIGNIFICANT CHANGE UP (ref 27–34)
MCHC RBC-ENTMCNC: 32.3 GM/DL — SIGNIFICANT CHANGE UP (ref 32–36)
MCV RBC AUTO: 84.5 FL — SIGNIFICANT CHANGE UP (ref 80–100)
MONOCYTES # BLD AUTO: 1.81 K/UL — HIGH (ref 0–0.9)
MONOCYTES NFR BLD AUTO: 10 % — SIGNIFICANT CHANGE UP (ref 2–14)
NEUTROPHILS # BLD AUTO: 15.08 K/UL — HIGH (ref 1.8–7.4)
NEUTROPHILS NFR BLD AUTO: 83.4 % — HIGH (ref 43–77)
PHOSPHATE SERPL-MCNC: 3.4 MG/DL — SIGNIFICANT CHANGE UP (ref 2.5–4.5)
PLATELET # BLD AUTO: 226 K/UL — SIGNIFICANT CHANGE UP (ref 150–400)
POTASSIUM SERPL-MCNC: 4 MMOL/L — SIGNIFICANT CHANGE UP (ref 3.5–5.3)
POTASSIUM SERPL-SCNC: 4 MMOL/L — SIGNIFICANT CHANGE UP (ref 3.5–5.3)
PROT SERPL-MCNC: 7 GM/DL — SIGNIFICANT CHANGE UP (ref 6–8.3)
PROTHROM AB SERPL-ACNC: 19.8 SEC — HIGH (ref 10.5–13.4)
RBC # BLD: 5.16 M/UL — SIGNIFICANT CHANGE UP (ref 4.2–5.8)
RBC # FLD: 14.5 % — SIGNIFICANT CHANGE UP (ref 10.3–14.5)
SODIUM SERPL-SCNC: 143 MMOL/L — SIGNIFICANT CHANGE UP (ref 135–145)
WBC # BLD: 18.08 K/UL — HIGH (ref 3.8–10.5)
WBC # FLD AUTO: 18.08 K/UL — HIGH (ref 3.8–10.5)

## 2023-03-28 PROCEDURE — 99233 SBSQ HOSP IP/OBS HIGH 50: CPT

## 2023-03-28 RX ORDER — ENOXAPARIN SODIUM 100 MG/ML
80 INJECTION SUBCUTANEOUS EVERY 12 HOURS
Refills: 0 | Status: DISCONTINUED | OUTPATIENT
Start: 2023-03-28 | End: 2023-03-28

## 2023-03-28 RX ORDER — POLYETHYLENE GLYCOL 3350 17 G/17G
17 POWDER, FOR SOLUTION ORAL DAILY
Refills: 0 | Status: DISCONTINUED | OUTPATIENT
Start: 2023-03-28 | End: 2023-04-03

## 2023-03-28 RX ORDER — WARFARIN SODIUM 2.5 MG/1
3 TABLET ORAL DAILY
Refills: 0 | Status: DISCONTINUED | OUTPATIENT
Start: 2023-03-29 | End: 2023-03-31

## 2023-03-28 RX ADMIN — ENOXAPARIN SODIUM 80 MILLIGRAM(S): 100 INJECTION SUBCUTANEOUS at 06:20

## 2023-03-28 RX ADMIN — CEFTRIAXONE 1000 MILLIGRAM(S): 500 INJECTION, POWDER, FOR SOLUTION INTRAMUSCULAR; INTRAVENOUS at 23:19

## 2023-03-28 RX ADMIN — POLYETHYLENE GLYCOL 3350 17 GRAM(S): 17 POWDER, FOR SOLUTION ORAL at 12:20

## 2023-03-28 RX ADMIN — ENOXAPARIN SODIUM 80 MILLIGRAM(S): 100 INJECTION SUBCUTANEOUS at 12:20

## 2023-03-28 NOTE — DIETITIAN INITIAL EVALUATION ADULT - PERTINENT LABORATORY DATA
03-28    143  |  108  |  54<H>  ----------------------------<  162<H>  4.0   |  33<H>  |  1.37<H>    Ca    9.3      28 Mar 2023 06:29  Phos  3.4     03-28  Mg     2.8     03-28    TPro  7.0  /  Alb  3.2<L>  /  TBili  0.6  /  DBili  x   /  AST  48<H>  /  ALT  79<H>  /  AlkPhos  91  03-28

## 2023-03-28 NOTE — DIETITIAN INITIAL EVALUATION ADULT - PERTINENT MEDS FT
MEDICATIONS  (STANDING):  cefTRIAXone Injectable. 1000 milliGRAM(s) IV Push every 24 hours  enoxaparin Injectable 80 milliGRAM(s) SubCutaneous every 12 hours  levothyroxine 50 MICROGram(s) Oral daily  polyethylene glycol 3350 17 Gram(s) Oral daily  warfarin 2.5 milliGRAM(s) Oral daily    MEDICATIONS  (PRN):  acetaminophen     Tablet .. 650 milliGRAM(s) Oral every 6 hours PRN Temp greater or equal to 38C (100.4F), Mild Pain (1 - 3)  aluminum hydroxide/magnesium hydroxide/simethicone Suspension 30 milliLiter(s) Oral every 4 hours PRN Dyspepsia  melatonin 3 milliGRAM(s) Oral at bedtime PRN Insomnia  ondansetron Injectable 4 milliGRAM(s) IV Push every 8 hours PRN Nausea and/or Vomiting
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
## 2023-03-28 NOTE — DIETITIAN INITIAL EVALUATION ADULT - NSFNSPHYEXAMSKINFT_GEN_A_CORE
Mychal score = 12, ecchymotic skin; flaky dry skin  Pressure Injury 1: coccyx, Suspected deep tissue injury

## 2023-03-28 NOTE — DIETITIAN INITIAL EVALUATION ADULT - ORAL INTAKE PTA/DIET HISTORY
Unable to obtain accurate diet/wt hx 2/2 AMS. As per chart, pt lives at home by himself. He is able to participate in some grocery shopping and cooking. Has some meals/groceries delivered to his home. Consumes 3 meals per day, however is meeting <50% ENN.
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
## 2023-03-28 NOTE — DIETITIAN INITIAL EVALUATION ADULT - OTHER INFO
76 y/o Male presented with change in mental status, weakness and increased urination for 3-4 days. Patient has dementia so he is not a good historian. As per the dtr, patient lives alone. She went to check on him last 2-3 days and found out he was getting more confused and weak over the last few days. He was not taking his medications regularly and was not able to control his urine. He was also having difficulty to move around.    Upon RD visit, consumed 100% breakfast tray - nursing assistant helped feed the pt. Unable to obtain accurate info upon assessment 2/2 AMS/ poor historian. UBW stated at 185# x 1 week ago, however ?accuracy. No wt hx present in chart. Unable to obtain bed scale wt 2/2 scale not working. Current wt doc'd at 180# however moderate to severe edema is skewing current wt appearance. Appeared very thin, frail, and lizette. NFPE reveals severe muscle/fat wasting. Noted w/ suspected DTI pressure injury. Will trial Ensure + HP shake TID to optimize nutritional needs (provides 350 kcal, 20g protein/ shake). Suggest to liberalize diet to regular to maximize caloric and nutrient intake. See recommendations below.

## 2023-03-28 NOTE — DOWNTIME INTERRUPTION NOTE - WHICH MANUAL FORMS INITIATED?
See paper records for clinical information entered during downtime   intake/output form  Nursing progress Note  vital sign flowsheet  fall risk

## 2023-03-28 NOTE — DIETITIAN INITIAL EVALUATION ADULT - ADD RECOMMEND
1) Liberalize diet to regular to maximize caloric and nutrient intake.   2) Provide Ensure + HP shake TID to optimize nutritional needs and promote wound healing (provides 350 kcal, 20g protein/ shake)   3) Encourage protein-rich foods, maximize food preferences   4) Monitor bowel movements, consider implementing bowel regimen 2/2 no BM x > 3 days.   5) Recommend to add MVI w/minerals, Vit C 500 mg BID, add Zinc Sulfate 220 mg x 10 days to promote wound healing.   6) Consider adding thiamine 100 mg daily 2/2 poor PO intake/ malnutrition   7) Monitor daily wt to track/trend changes  8) Confirm goals of care regarding nutrition support. Nutrition support is not recommended due to overall declining medical status which evidenced based studies indicate EN is not effective in prolonging survival and improving quality of life. It can also increase risk of aspiration pneumonia as well as other related issues.   RD will continue to monitor PO intake, labs, hydration, and wt prn.

## 2023-03-28 NOTE — DIETITIAN INITIAL EVALUATION ADULT - MUSCLE MASS (LOSS OF MUSCLE)
Xerosis Normal Treatment: I recommended application of Cetaphil or CeraVe numerous times a day and before going to bed to all dry areas.
Myalgia Monitoring: I explained this is common when taking isotretinoin. If this worsens they will contact us.
Kilograms Preamble Statement (Weight Entered In Details Tab): Reported Weight in kilograms:
Cheilitis Normal Treatment: I recommended application of Vaseline or Aquaphor numerous times a day (as often as every hour) and before going to bed.
Add High Risk Medication Management Associated Diagnosis?: No
Retinoid Dermatitis Normal Treatment: I recommended more frequent application of Cetaphil or CeraVe to the areas of dermatitis.
Myalgia Treatment: I explained this is common when taking isotretinoin. If this worsens they will contact us. They may try OTC ibuprofen.
Xerosis Aggressive Treatment: I recommended application of Cetaphil or CeraVe numerous times a day and before going to bed to all dry areas. I also prescribed a topical steroid for twice daily use.
Nosebleeds Normal Treatment: I explained this is common when taking isotretinoin. I recommended saline mist in each nostril multiple times a day. If this worsens they will contact us.
Male Completion Statement: After discussing his treatment course we decided to discontinue isotretinoin therapy at this time. He shouldn't donate blood for one month after the last dose. He should call with any new symptoms of depression.
What Is The Patient's Gender: Female
Retinoid Dermatitis Aggressive Treatment: I recommended more frequent application of Cetaphil or CeraVe to the areas of dermatitis. I also prescribed a topical steroid for twice daily use until the dermatitis resolves.
Months Of Therapy Completed: 1
Cheilitis Aggressive Treatment: I recommended application of Vaseline or Aquaphor numerous times a day (as often as every hour) and before going to bed. I also prescribed a topical steroid for twice daily use.
Headache Monitoring: I recommended monitoring the headaches for now. There is no evidence of increased intracranial pressure. They were instructed to call if the headaches are worsening.
Are Labs Available For Review?: Yes
Counseling Text: I reviewed the side effect in detail. Patient should get monthly blood tests, not donate blood, not drive at night if vision affected, and not share medication.
Xerosis Normal Treatment: I recommended application of Cetaphil or CeraVe numerous times a day going to bed to all dry areas.
Hypercholesterolemia Monitoring: I explained this is common when taking isotretinoin. We will monitor closely.
Pounds Preamble Statement (Weight Entered In Details Tab): Reported Weight in pounds: 118
Detail Level: Zone
Female Completion Statement: After discussing her treatment course we decided to discontinue isotretinoin therapy at this time. I explained that she would need to continue her birth control methods for at least one month after the last dosage. She should also get a pregnancy test one month after the last dose. She shouldn't donate blood for one month after the last dose. She should call with any new symptoms of depression.
Xerosis Aggressive Treatment: I recommended application of Cetaphil or CeraVe numerous times a day going to bed to all dry areas. I also prescribed a topical steroid for twice daily use.
Dosing Month 1 (Required For Cumulative Dosing): 20mg Daily
Weight Units: pounds
Female Pregnancy Counseling Text: Female patients should also be on two forms of birth control while taking this medication and for one month after their last dose.
Target Cumulative Dosage (In Mg/Kg): 135
Upper Range (In Mg/Kg): 150
Lower Range (In Mg/Kg): 120
Temples.../Clavicles.../Shoulders.../Calf.../Dorsal hand...

## 2023-03-28 NOTE — CHART NOTE - NSCHARTNOTEFT_GEN_A_CORE
Pt not in room.  Discussed w/ Dr. Skaggs.  Further evaluation & treatment of severe AS may be done as outpatient  given active infection.  restart lasix at outpatient dose.  will sign off please call if questions.

## 2023-03-29 LAB
ALBUMIN SERPL ELPH-MCNC: 2.9 G/DL — LOW (ref 3.3–5)
ALP SERPL-CCNC: 80 U/L — SIGNIFICANT CHANGE UP (ref 40–120)
ALT FLD-CCNC: 79 U/L — HIGH (ref 12–78)
ANION GAP SERPL CALC-SCNC: 4 MMOL/L — LOW (ref 5–17)
AST SERPL-CCNC: 49 U/L — HIGH (ref 15–37)
BILIRUB SERPL-MCNC: 0.5 MG/DL — SIGNIFICANT CHANGE UP (ref 0.2–1.2)
BUN SERPL-MCNC: 52 MG/DL — HIGH (ref 7–23)
CALCIUM SERPL-MCNC: 9 MG/DL — SIGNIFICANT CHANGE UP (ref 8.5–10.1)
CHLORIDE SERPL-SCNC: 109 MMOL/L — HIGH (ref 96–108)
CO2 SERPL-SCNC: 30 MMOL/L — SIGNIFICANT CHANGE UP (ref 22–31)
CREAT SERPL-MCNC: 1.29 MG/DL — SIGNIFICANT CHANGE UP (ref 0.5–1.3)
EGFR: 57 ML/MIN/1.73M2 — LOW
GLUCOSE SERPL-MCNC: 158 MG/DL — HIGH (ref 70–99)
HCT VFR BLD CALC: 39.1 % — SIGNIFICANT CHANGE UP (ref 39–50)
HGB BLD-MCNC: 12.6 G/DL — LOW (ref 13–17)
INR BLD: 1.68 RATIO — HIGH (ref 0.88–1.16)
MAGNESIUM SERPL-MCNC: 2.6 MG/DL — SIGNIFICANT CHANGE UP (ref 1.6–2.6)
MCHC RBC-ENTMCNC: 27 PG — SIGNIFICANT CHANGE UP (ref 27–34)
MCHC RBC-ENTMCNC: 32.2 GM/DL — SIGNIFICANT CHANGE UP (ref 32–36)
MCV RBC AUTO: 83.7 FL — SIGNIFICANT CHANGE UP (ref 80–100)
PHOSPHATE SERPL-MCNC: 3.5 MG/DL — SIGNIFICANT CHANGE UP (ref 2.5–4.5)
PLATELET # BLD AUTO: 259 K/UL — SIGNIFICANT CHANGE UP (ref 150–400)
POTASSIUM SERPL-MCNC: 3.6 MMOL/L — SIGNIFICANT CHANGE UP (ref 3.5–5.3)
POTASSIUM SERPL-SCNC: 3.6 MMOL/L — SIGNIFICANT CHANGE UP (ref 3.5–5.3)
PROT SERPL-MCNC: 6.4 GM/DL — SIGNIFICANT CHANGE UP (ref 6–8.3)
PROTHROM AB SERPL-ACNC: 19.6 SEC — HIGH (ref 10.5–13.4)
RBC # BLD: 4.67 M/UL — SIGNIFICANT CHANGE UP (ref 4.2–5.8)
RBC # FLD: 14.6 % — HIGH (ref 10.3–14.5)
SODIUM SERPL-SCNC: 143 MMOL/L — SIGNIFICANT CHANGE UP (ref 135–145)
WBC # BLD: 19.13 K/UL — HIGH (ref 3.8–10.5)
WBC # FLD AUTO: 19.13 K/UL — HIGH (ref 3.8–10.5)

## 2023-03-29 PROCEDURE — 99233 SBSQ HOSP IP/OBS HIGH 50: CPT

## 2023-03-29 RX ORDER — VANCOMYCIN HCL 1 G
750 VIAL (EA) INTRAVENOUS EVERY 12 HOURS
Refills: 0 | Status: DISCONTINUED | OUTPATIENT
Start: 2023-03-29 | End: 2023-04-01

## 2023-03-29 RX ORDER — QUETIAPINE FUMARATE 200 MG/1
12.5 TABLET, FILM COATED ORAL ONCE
Refills: 0 | Status: COMPLETED | OUTPATIENT
Start: 2023-03-29 | End: 2023-03-29

## 2023-03-29 RX ADMIN — Medication 100 MILLIGRAM(S): at 11:49

## 2023-03-29 RX ADMIN — WARFARIN SODIUM 3 MILLIGRAM(S): 2.5 TABLET ORAL at 21:14

## 2023-03-29 RX ADMIN — Medication 3 MILLIGRAM(S): at 00:59

## 2023-03-29 RX ADMIN — Medication 50 MICROGRAM(S): at 05:55

## 2023-03-29 RX ADMIN — QUETIAPINE FUMARATE 12.5 MILLIGRAM(S): 200 TABLET, FILM COATED ORAL at 00:59

## 2023-03-29 RX ADMIN — Medication 250 MILLIGRAM(S): at 19:34

## 2023-03-29 RX ADMIN — POLYETHYLENE GLYCOL 3350 17 GRAM(S): 17 POWDER, FOR SOLUTION ORAL at 11:52

## 2023-03-29 RX ADMIN — CEFTRIAXONE 1000 MILLIGRAM(S): 500 INJECTION, POWDER, FOR SOLUTION INTRAMUSCULAR; INTRAVENOUS at 23:03
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
## 2023-03-29 NOTE — CONSULT NOTE ADULT - ASSESSMENT
78yo M presents w/ AMS/weakness, lower extremity swelling  Vdup showed b/l below knee DVT  uncertain if patient was taking coumadin. INR therapeutic. Uncertain if DVT occurred chronic vs. acutely    - will review the venous duplex tomorrow to ascertain whether DVT is chronic vs. acute  - continue anticoagulation  - vascular surgery will follow    Plan discussed with vascular surgery attending, Dr. Orellana
Assessment:   1. Hypertrohic Onychomycosis digits 1-5 B/L  2. Pain in left foot  3. Pain in right foot  4. Difficulty ambulating    Pt  - Patient was seen and examined by podiatry team  - Discussed the case with attending Dr. Griffiths  - Discussed  treatment plans with the patient  - Educated Pt about the proper foot hygiene, to prevent the recurrence of the fungal foot infection when cleared.  - Educated Pt about importance of daily foot examinations and proper shoe gear  - Pt demonstrated verbal understanding  - Aseptic mechanical debridement and curettage of all fungal toe nails 1-5 bilateral, using sterile nail nippers  - Pt tolerated the procedure well, without any complication  - Podiatry to sign off  - Please re-consult as needed
76 yo Male presented with change in mental status, weakness and increased urination for 3-4 days. Patient has dementia so he is not a good historian. Daughter at the bedside provided the history. As per the Daughter, patient lives alone. She went to check on him last 2-3 days and found out he was getting more confuse and weak over the last few days. He was not taking his medications  regularly and was not able to control his urine. He was also having difficulty to move around. So yesterday daughter went to see him again and called 911 as his condition continued to get worse. No recent travel and no sick contact.  As per the daughter his legs are edematous. in last 2-3 days as he is not taking his meds regularly. Found to have b/l DVTs. Was given rocephin/doxy d/t concrn for superimposed cellulitis.     1. Bilateral DVT. RLE superimposed cellulitis. Leukocytosis  - imaging reviewed, wbc ct reactive  - vascular eval noted  - on ac  - change doxycycline to vancomycin 750mg q12 check trough prior to 4th dose  - on rocephin 1gm daily #4  - continue with abx coverage  - fu cultures  - monitor temps  - tolerating abx well so far; no side effects noted  - reason for abx use and side effects reviewed with patient  - supportive care  - fu cbc    2. other issues - care per medicine

## 2023-03-29 NOTE — CONSULT NOTE ADULT - SUBJECTIVE AND OBJECTIVE BOX
Patient is a 77y old  Male who presents with a chief complaint of Change in mental status (28 Mar 2023 17:40)    HPI:  76 yo Male presented with change in mental status, weakness and increased urination for 3-4 days. Patient has dementia so he is not a good historian. Daughter at the bedside provided the history. As per the Daughter, patient lives alone. She went to check on him last 2-3 days and found out he was getting more confuse and weak over the last few days. He was not taking his medications  regularly and was not able to control his urine. He was also having difficulty to move around. So yesterday daughter went to see him again and called 911 as his condition continued to get worse. No recent travel and no sick contact.  As per the daughter his legs are edematous. in last 2-3 days as he is not taking his meds regularly.     PMHx:  CKD  CHF  A fib  Dementia     PSHx:  No recent surgeries.  (26 Mar 2023 00:41)      PMH: as above  PSH: as above  Meds: per reconciliation sheet, noted below  MEDICATIONS  (STANDING):  cefTRIAXone Injectable. 1000 milliGRAM(s) IV Push every 24 hours  doxycycline monohydrate Capsule 100 milliGRAM(s) Oral every 12 hours  levothyroxine 50 MICROGram(s) Oral daily  polyethylene glycol 3350 17 Gram(s) Oral daily  warfarin 3 milliGRAM(s) Oral daily    Allergies    No Known Allergies    Intolerances      Social: no smoking, no alcohol, no illegal drugs; no recent travel, no exposure to TB  FAMILY HISTORY:  No pertinent family history in first degree relatives       no history of premature cardiovascular disease in first degree relatives    ROS: unable to obtain d/t medical condition    All other systems reviewed and are negative    Vital Signs Last 24 Hrs  T(C): 36.7 (29 Mar 2023 07:52), Max: 36.7 (28 Mar 2023 15:18)  T(F): 98.1 (29 Mar 2023 07:52), Max: 98.1 (29 Mar 2023 07:52)  HR: 81 (29 Mar 2023 07:52) (74 - 83)  BP: 134/76 (29 Mar 2023 07:52) (131/77 - 138/80)  BP(mean): --  RR: 18 (29 Mar 2023 07:52) (18 - 18)  SpO2: 99% (29 Mar 2023 07:52) (98% - 100%)    Parameters below as of 29 Mar 2023 07:52  Patient On (Oxygen Delivery Method): room air      Daily     Daily     PE:  Constitutional: frail looking  HEENT: NC/AT, EOMI, PERRLA, conjunctivae clear; ears and nose atraumatic; pharynx benign  Neck: supple; thyroid not palpable  Back: no tenderness  Respiratory: respiratory effort normal; clear to auscultation  Cardiovascular: S1S2 regular, no murmurs  Abdomen: soft, not tender, not distended, positive BS; liver and spleen WNL  Genitourinary: no suprapubic tenderness  Lymphatic: no LN palpable  Musculoskeletal: no muscle tenderness, no joint swelling or tenderness  Extremities: no pedal edema  Neurological/ Psychiatric: AxOx3, Judgement and insight normal;  moving all extremities  Skin: no rashes; no palpable lesions    Labs: all available labs reviewed                        12.6 19.13 )-----------( 259      ( 29 Mar 2023 07:11 )             39.1     03-29    143  |  109<H>  |  52<H>  ----------------------------<  158<H>  3.6   |  30  |  1.29    Ca    9.0      29 Mar 2023 07:11  Phos  3.5     03-29  Mg     2.6     03-29    TPro  6.4  /  Alb  2.9<L>  /  TBili  0.5  /  DBili  x   /  AST  49<H>  /  ALT  79<H>  /  AlkPhos  80  03-29     LIVER FUNCTIONS - ( 29 Mar 2023 07:11 )  Alb: 2.9 g/dL / Pro: 6.4 gm/dL / ALK PHOS: 80 U/L / ALT: 79 U/L / AST: 49 U/L / GGT: x                 Radiology: all available radiological tests reviewed  < from: US Duplex Venous Lower Ext Complete, Bilateral (03.26.23 @ 13:48) >    ACC: [id number] EXAM:  US DPLX LWR EXT VEINS COMPL BI   ORDERED BY: HITESH JOHN     PROCEDURE DATE:  03/26/2023          INTERPRETATION:  CLINICAL INFORMATION: Lower extremity pain.    COMPARISON: None available.    TECHNIQUE: Duplex sonography of the BILATERAL LOWER extremity veins with   color and spectral Doppler, with and without compression.    FINDINGS:    RIGHT:  Normal compressibility of the RIGHT common femoral, femoral and popliteal   veins.  Doppler examination shows normal spontaneous and phasic flow.  There is long segment superficial venous thrombosis in the right greater   saphenous vein.  There is occlusive deep venous thrombosis in the right posterior tibial,   peroneal and soleal veins.    LEFT:  Normal compressibility of the LEFT common femoral, femoral and popliteal   veins.  Doppler examination shows normal spontaneous and phasic flow.  There is long segment superficial venous thrombosis in the left greater   saphenous vein.  There is occlusive deep venous thrombosis in the left posterior tibial,   peroneal and soleal veins.    IMPRESSION:  Extensive occlusive deep venous thrombosis in both calves, below the knee   only.  Long segment superficial venous thrombosis in the greater saphenous   bilaterally, above the knee bilaterally.        ACC: [id number] EXAM:  CT BRAIN   ORDERED BY: MAXIME TRUJILLO     PROCEDURE DATE:  03/25/2023          INTERPRETATION:  CLINICAL INFORMATION: Altered mental status    TECHNIQUE: Multiple axial CT images of the calvarium and brain were   obtained without contrast. Sagittal and coronal reformats were obtained.    COMPARISON: None    FINDINGS:    There is no mass effect, intracranial hemorrhage, or midline shift.    There is no CT evidence of acute territorial infarct.    Mild cerebral volume loss. Mild periventricular white matter lucencies   are nonspecific but likely sequela of microvascular ischemic disease.    The imaged portions of the paranasal sinuses and mastoids are well   aerated.    There is no osseous abnormality.    IMPRESSION:    No intracranial hemorrhage, mass effect, or midline shift.        ACC: [id number] EXAM:  XR CHEST 1 VIEW   ORDERED BY: MAXIME TRUJILLO     PROCEDURE DATE:  03/25/2023          INTERPRETATION:  Exam:XR CHEST    clinical history:Altered mental status    Heart size is normal. Lungs show no acute infiltrate. No pleural effusion.    IMPRESSION:  No active parenchymal disease in the chest.      Advanced directives addressed: full resuscitation Patient is a 77y old  Male who presents with a chief complaint of Change in mental status (28 Mar 2023 17:40)    HPI:  76 yo Male presented with change in mental status, weakness and increased urination for 3-4 days. Patient has dementia so he is not a good historian. Daughter at the bedside provided the history. As per the Daughter, patient lives alone. She went to check on him last 2-3 days and found out he was getting more confuse and weak over the last few days. He was not taking his medications  regularly and was not able to control his urine. He was also having difficulty to move around. So yesterday daughter went to see him again and called 911 as his condition continued to get worse. No recent travel and no sick contact.  As per the daughter his legs are edematous. in last 2-3 days as he is not taking his meds regularly. Found to have b/l DVTs. Was given rocephin/doxy d/t concrn for superimposed cellulitis.     PMHx:  CKD  CHF  A fib  Dementia     PSHx:  No recent surgeries.  (26 Mar 2023 00:41)      PMH: as above  PSH: as above  Meds: per reconciliation sheet, noted below  MEDICATIONS  (STANDING):  cefTRIAXone Injectable. 1000 milliGRAM(s) IV Push every 24 hours  doxycycline monohydrate Capsule 100 milliGRAM(s) Oral every 12 hours  levothyroxine 50 MICROGram(s) Oral daily  polyethylene glycol 3350 17 Gram(s) Oral daily  warfarin 3 milliGRAM(s) Oral daily    Allergies    No Known Allergies    Intolerances      Social: no smoking, no alcohol, no illegal drugs; no recent travel, no exposure to TB  FAMILY HISTORY:  No pertinent family history in first degree relatives       no history of premature cardiovascular disease in first degree relatives    ROS: unable to obtain d/t medical condition    All other systems reviewed and are negative    Vital Signs Last 24 Hrs  T(C): 36.7 (29 Mar 2023 07:52), Max: 36.7 (28 Mar 2023 15:18)  T(F): 98.1 (29 Mar 2023 07:52), Max: 98.1 (29 Mar 2023 07:52)  HR: 81 (29 Mar 2023 07:52) (74 - 83)  BP: 134/76 (29 Mar 2023 07:52) (131/77 - 138/80)  BP(mean): --  RR: 18 (29 Mar 2023 07:52) (18 - 18)  SpO2: 99% (29 Mar 2023 07:52) (98% - 100%)    Parameters below as of 29 Mar 2023 07:52  Patient On (Oxygen Delivery Method): room air      Daily     Daily     PE:  Constitutional: frail looking  HEENT: NC/AT, EOMI, PERRLA, conjunctivae clear; ears and nose atraumatic; pharynx benign  Neck: supple; thyroid not palpable  Back: no tenderness  Respiratory: respiratory effort normal; clear to auscultation  Cardiovascular: S1S2 regular, no murmurs  Abdomen: soft, not tender, not distended, positive BS; liver and spleen WNL  Genitourinary: no suprapubic tenderness  Lymphatic: no LN palpable  Musculoskeletal: no muscle tenderness, no joint swelling or tenderness  Extremities: no pedal edema RLE erythema, warmth, tenderness  Neurological/ Psychiatric:  moving all extremities  Skin: no rashes; no palpable lesions    Labs: all available labs reviewed                        12.6   19.13 )-----------( 259      ( 29 Mar 2023 07:11 )             39.1     03-29    143  |  109<H>  |  52<H>  ----------------------------<  158<H>  3.6   |  30  |  1.29    Ca    9.0      29 Mar 2023 07:11  Phos  3.5     03-29  Mg     2.6     03-29    TPro  6.4  /  Alb  2.9<L>  /  TBili  0.5  /  DBili  x   /  AST  49<H>  /  ALT  79<H>  /  AlkPhos  80  03-29     LIVER FUNCTIONS - ( 29 Mar 2023 07:11 )  Alb: 2.9 g/dL / Pro: 6.4 gm/dL / ALK PHOS: 80 U/L / ALT: 79 U/L / AST: 49 U/L / GGT: x                 Radiology: all available radiological tests reviewed  < from: US Duplex Venous Lower Ext Complete, Bilateral (03.26.23 @ 13:48) >    ACC: [id number] EXAM:  US DPLX LWR EXT VEINS COMPL BI   ORDERED BY: HITESH JOHN     PROCEDURE DATE:  03/26/2023          INTERPRETATION:  CLINICAL INFORMATION: Lower extremity pain.    COMPARISON: None available.    TECHNIQUE: Duplex sonography of the BILATERAL LOWER extremity veins with   color and spectral Doppler, with and without compression.    FINDINGS:    RIGHT:  Normal compressibility of the RIGHT common femoral, femoral and popliteal   veins.  Doppler examination shows normal spontaneous and phasic flow.  There is long segment superficial venous thrombosis in the right greater   saphenous vein.  There is occlusive deep venous thrombosis in the right posterior tibial,   peroneal and soleal veins.    LEFT:  Normal compressibility of the LEFT common femoral, femoral and popliteal   veins.  Doppler examination shows normal spontaneous and phasic flow.  There is long segment superficial venous thrombosis in the left greater   saphenous vein.  There is occlusive deep venous thrombosis in the left posterior tibial,   peroneal and soleal veins.    IMPRESSION:  Extensive occlusive deep venous thrombosis in both calves, below the knee   only.  Long segment superficial venous thrombosis in the greater saphenous   bilaterally, above the knee bilaterally.        ACC: [id number] EXAM:  CT BRAIN   ORDERED BY: MAXIME TRUJILLO     PROCEDURE DATE:  03/25/2023          INTERPRETATION:  CLINICAL INFORMATION: Altered mental status    TECHNIQUE: Multiple axial CT images of the calvarium and brain were   obtained without contrast. Sagittal and coronal reformats were obtained.    COMPARISON: None    FINDINGS:    There is no mass effect, intracranial hemorrhage, or midline shift.    There is no CT evidence of acute territorial infarct.    Mild cerebral volume loss. Mild periventricular white matter lucencies   are nonspecific but likely sequela of microvascular ischemic disease.    The imaged portions of the paranasal sinuses and mastoids are well   aerated.    There is no osseous abnormality.    IMPRESSION:    No intracranial hemorrhage, mass effect, or midline shift.        ACC: [id number] EXAM:  XR CHEST 1 VIEW   ORDERED BY: MAXIME TRUJILLO     PROCEDURE DATE:  03/25/2023          INTERPRETATION:  Exam:XR CHEST    clinical history:Altered mental status    Heart size is normal. Lungs show no acute infiltrate. No pleural effusion.    IMPRESSION:  No active parenchymal disease in the chest.      Advanced directives addressed: full resuscitation

## 2023-03-30 LAB
ALBUMIN SERPL ELPH-MCNC: 2.9 G/DL — LOW (ref 3.3–5)
ALP SERPL-CCNC: 78 U/L — SIGNIFICANT CHANGE UP (ref 40–120)
ALT FLD-CCNC: 91 U/L — HIGH (ref 12–78)
ANION GAP SERPL CALC-SCNC: 3 MMOL/L — LOW (ref 5–17)
AST SERPL-CCNC: 57 U/L — HIGH (ref 15–37)
BASOPHILS # BLD AUTO: 0.05 K/UL — SIGNIFICANT CHANGE UP (ref 0–0.2)
BASOPHILS NFR BLD AUTO: 0.2 % — SIGNIFICANT CHANGE UP (ref 0–2)
BILIRUB SERPL-MCNC: 0.6 MG/DL — SIGNIFICANT CHANGE UP (ref 0.2–1.2)
BUN SERPL-MCNC: 42 MG/DL — HIGH (ref 7–23)
CALCIUM SERPL-MCNC: 9 MG/DL — SIGNIFICANT CHANGE UP (ref 8.5–10.1)
CHLORIDE SERPL-SCNC: 110 MMOL/L — HIGH (ref 96–108)
CO2 SERPL-SCNC: 31 MMOL/L — SIGNIFICANT CHANGE UP (ref 22–31)
CREAT SERPL-MCNC: 1.24 MG/DL — SIGNIFICANT CHANGE UP (ref 0.5–1.3)
EGFR: 60 ML/MIN/1.73M2 — SIGNIFICANT CHANGE UP
EOSINOPHIL # BLD AUTO: 0.38 K/UL — SIGNIFICANT CHANGE UP (ref 0–0.5)
EOSINOPHIL NFR BLD AUTO: 1.9 % — SIGNIFICANT CHANGE UP (ref 0–6)
GLUCOSE SERPL-MCNC: 159 MG/DL — HIGH (ref 70–99)
HCT VFR BLD CALC: 38.5 % — LOW (ref 39–50)
HGB BLD-MCNC: 12.3 G/DL — LOW (ref 13–17)
IMM GRANULOCYTES NFR BLD AUTO: 0.8 % — SIGNIFICANT CHANGE UP (ref 0–0.9)
INR BLD: 1.64 RATIO — HIGH (ref 0.88–1.16)
LYMPHOCYTES # BLD AUTO: 0.78 K/UL — LOW (ref 1–3.3)
LYMPHOCYTES # BLD AUTO: 3.8 % — LOW (ref 13–44)
MCHC RBC-ENTMCNC: 27.2 PG — SIGNIFICANT CHANGE UP (ref 27–34)
MCHC RBC-ENTMCNC: 31.9 GM/DL — LOW (ref 32–36)
MCV RBC AUTO: 85 FL — SIGNIFICANT CHANGE UP (ref 80–100)
MONOCYTES # BLD AUTO: 1.83 K/UL — HIGH (ref 0–0.9)
MONOCYTES NFR BLD AUTO: 8.9 % — SIGNIFICANT CHANGE UP (ref 2–14)
NEUTROPHILS # BLD AUTO: 17.31 K/UL — HIGH (ref 1.8–7.4)
NEUTROPHILS NFR BLD AUTO: 84.4 % — HIGH (ref 43–77)
PLATELET # BLD AUTO: 258 K/UL — SIGNIFICANT CHANGE UP (ref 150–400)
POTASSIUM SERPL-MCNC: 3.9 MMOL/L — SIGNIFICANT CHANGE UP (ref 3.5–5.3)
POTASSIUM SERPL-SCNC: 3.9 MMOL/L — SIGNIFICANT CHANGE UP (ref 3.5–5.3)
PROT SERPL-MCNC: 6.4 GM/DL — SIGNIFICANT CHANGE UP (ref 6–8.3)
PROTHROM AB SERPL-ACNC: 19.1 SEC — HIGH (ref 10.5–13.4)
RBC # BLD: 4.53 M/UL — SIGNIFICANT CHANGE UP (ref 4.2–5.8)
RBC # FLD: 14.6 % — HIGH (ref 10.3–14.5)
SODIUM SERPL-SCNC: 144 MMOL/L — SIGNIFICANT CHANGE UP (ref 135–145)
VANCOMYCIN TROUGH SERPL-MCNC: 9.6 UG/ML — LOW (ref 10–20)
WBC # BLD: 20.52 K/UL — HIGH (ref 3.8–10.5)
WBC # FLD AUTO: 20.52 K/UL — HIGH (ref 3.8–10.5)

## 2023-03-30 PROCEDURE — 99233 SBSQ HOSP IP/OBS HIGH 50: CPT | Mod: GC

## 2023-03-30 RX ORDER — WARFARIN SODIUM 2.5 MG/1
1 TABLET ORAL ONCE
Refills: 0 | Status: COMPLETED | OUTPATIENT
Start: 2023-03-30 | End: 2023-03-30

## 2023-03-30 RX ADMIN — Medication 50 MICROGRAM(S): at 05:40

## 2023-03-30 RX ADMIN — WARFARIN SODIUM 3 MILLIGRAM(S): 2.5 TABLET ORAL at 21:43

## 2023-03-30 RX ADMIN — WARFARIN SODIUM 1 MILLIGRAM(S): 2.5 TABLET ORAL at 11:25

## 2023-03-30 RX ADMIN — CEFTRIAXONE 1000 MILLIGRAM(S): 500 INJECTION, POWDER, FOR SOLUTION INTRAMUSCULAR; INTRAVENOUS at 23:28

## 2023-03-30 RX ADMIN — Medication 250 MILLIGRAM(S): at 21:44

## 2023-03-30 RX ADMIN — Medication 650 MILLIGRAM(S): at 23:39

## 2023-03-30 RX ADMIN — Medication 250 MILLIGRAM(S): at 09:30

## 2023-03-30 RX ADMIN — Medication 3 MILLIGRAM(S): at 23:39

## 2023-03-30 RX ADMIN — POLYETHYLENE GLYCOL 3350 17 GRAM(S): 17 POWDER, FOR SOLUTION ORAL at 11:26

## 2023-03-30 RX ADMIN — Medication 250 MILLIGRAM(S): at 06:14

## 2023-03-30 NOTE — PROGRESS NOTE ADULT - ASSESSMENT
77 year old male with a PMH of CHF, CKD, Afib and Dementia presented with change in mental status, weakness and increased urination for 3-4 days.       #Severe Aortic Stenosis  -TTE: severe aortic stenosis, known prior to admission per daughter  -Following with Cardiologist Dr. Nesbitt as outpatient  -Cardiology following, recs appreciated   -Outpt valve surgery with Dr. Bowling    #RLE edema and erythema  #Cellulitis? / Leukocytosis  #b/l LE DVTs  -Pt with history of CHF with worsening lower extremity edema  -no SOB or chest pain  -On furosemide 40 daily at home and spironolactone  -s/p IV lasix 40 on admission  -On physical exam: + RLE erythema, 2+ pitting edema with wrinkling with b/l lower extremity TTP, +onychomycosis, suspect RLE cellulitis  -On IV ceftriaxone, will monitor for worsening symptoms   -WBC 17.92 > 19.47, negative SIRS; lactacte WNL  -US doppler done for r/o of DVT: + extensive occlusive DVTs in both calves with b/l superficial venous thrombosis in greater saphenous   -Patient on outpatient coumadin; compliance unknown  -Increase to Coumadin 3 mg QD; Monitor INR levels  -Vascular following, recs appreciated  -s/p Lovenox 80mg  -TTE: severe aortic stenosis, known prior to admission per daughter  -Following with Cardiologist Dr. Nesbitt as outpatient  -daily weights  -Strict I&Os      #Encephalopathy   #UTI  -Pt with urinary incontinence, weakness in gait and dementia  -CTH: negative for ICH, mass effect or midline shift. + Mild cerebral volume loss +microvascular ischemic disease, less likely NPH   -UA +nitrites and WBCs; collection contaminated  -Urine culture: 10-49,000 Coag negative staph  -c/w IV ceftriaxone for 1 more day    #CKD  -Pt with hx of CKD  -Trend Creatinine levels  -Avoid nephrotoxic agents    #Afib   -HR controlled  -EKG: AFib-rate controlled  -continue Coumadin  -follow INR and adjust accordingly  -TTE: LVEF 60-65% with severe AS    #DVT ppx : On Coumadin    #Code status; Full code    #Disposition Planning: Daughters Florinda (main guardian) and Yuko are guardians of the patient. Spoke with Yuko on the phone and stated that she will notify Florinda to bring in guardianship paperwork. Possibly discuss GOC and HCP. Patient with history of Dementia currently living alone and not responding to phone calls, multiple fall injuries in the past and recently scammed out of all of his money, per daughter Yuko. Daughters interested in possible placement or home care. Case management and Social Work following, recs appreciated. Pending placement to [facility] fajardo/ida Nelson 77 year old male with a PMH of CHF, CKD, Afib and Dementia presented with change in mental status, weakness and increased urination for 3-4 days.      #RLE edema and erythema   #Cellulitis? / Leukocytosis   #b/l LE DVTs   -Pt with history of CHF with worsening lower extremity edema   -no SOB or chest pain   -On furosemide 40 daily at home and spironolactone   -s/p IV lasix 40 on admission   -On physical exam: + RLE erythema, 2+ pitting edema with wrinkling with b/l lower extremity TTP, +onychomycosis, suspect RLE cellulitis   -On IV ceftriaxone, will monitor for worsening symptoms    -WBC 17.92 > 19.47, negative SIRS; lactacte WNL   -US doppler done for r/o of DVT: + extensive occlusive DVTs in both calves with b/l superficial venous thrombosis in greater saphenous    -s/p Lovenox 80mg   -Patient on outpatient coumadin; compliance unknown   -Increase to Coumadin 3 mg QD; INR levels ~1   -Coumadin 1mg x1 ordered in addition to Coumadin 3 mg   -Monitor INR levels   -Vascular following, recs appreciated   -ID consulted, recs appreciated: Transition to 7 day course of PO doxycycline upon discharge   -daily weights   -Strict I&Os     #Severe Aortic Stenosis   -TTE: severe aortic stenosis, known prior to admission per daughter   -Following with Cardiologist Dr. Nesbitt as outpatient   -Cardiology following, recs appreciated    -Outpt valve surgery with Dr. Bowling       #Encephalopathy    #UTI   -Pt with urinary incontinence, weakness in gait and dementia   -CTH: negative for ICH, mass effect or midline shift. + Mild cerebral volume loss +microvascular ischemic disease, less likely NPH    -UA +nitrites and WBCs; collection contaminated   -Urine culture: 10-49,000 Coag negative staph   -c/w IV ceftriaxone and transition to PO on discharge     #CKD   -Pt with hx of CKD   -Trend Creatinine levels   -Avoid nephrotoxic agents     #Afib    -HR controlled   -EKG: AFib-rate controlled   -continue Coumadin   -follow INR and adjust accordingly   -TTE: LVEF 60-65% with severe AS     #DVT ppx : On Coumadin     #Code status; Full code     #Disposition Planning: Daughters Florinda (main guardian) and Yuko are guardians of the patient. Spoke with Yuko on the phone and stated that she will notify Florinda to bring in guardianship paperwork. Possibly discuss GOC and HCP. Patient with history of Dementia currently living alone and not responding to phone calls, multiple fall injuries in the past and recently scammed out of all of his money, per daughter Yuko. Daughters interested in possible placement or home care. Case management and Social Work following, recs appreciated. Pending placement to [facility]

## 2023-03-30 NOTE — PROGRESS NOTE ADULT - SUBJECTIVE AND OBJECTIVE BOX
Date of service: 03-30-23 @ 11:24    pt seen and examined   RLE less redness  has pain  no fevers    ROS: no fever or chills; denies dizziness, no HA, no SOB or cough, no abdominal pain, no diarrhea or constipation; no dysuria, no urinary frequency    MEDICATIONS  (STANDING):  cefTRIAXone Injectable. 1000 milliGRAM(s) IV Push every 24 hours  levothyroxine 50 MICROGram(s) Oral daily  polyethylene glycol 3350 17 Gram(s) Oral daily  vancomycin  IVPB 750 milliGRAM(s) IV Intermittent every 12 hours  warfarin 1 milliGRAM(s) Oral once  warfarin 3 milliGRAM(s) Oral daily    Vital Signs Last 24 Hrs  T(C): 36.4 (30 Mar 2023 08:12), Max: 36.6 (29 Mar 2023 23:53)  T(F): 97.5 (30 Mar 2023 08:12), Max: 97.9 (29 Mar 2023 23:53)  HR: 82 (30 Mar 2023 08:12) (81 - 82)  BP: 152/84 (30 Mar 2023 08:12) (140/86 - 152/84)  BP(mean): --  RR: 18 (30 Mar 2023 08:12) (18 - 18)  SpO2: 99% (30 Mar 2023 08:12) (96% - 99%)    Parameters below as of 30 Mar 2023 08:12  Patient On (Oxygen Delivery Method): room air      PE:  Constitutional: frail looking  HEENT: NC/AT, EOMI, PERRLA, conjunctivae clear; ears and nose atraumatic; pharynx benign  Neck: supple; thyroid not palpable  Back: no tenderness  Respiratory: respiratory effort normal; clear to auscultation  Cardiovascular: S1S2 regular, no murmurs  Abdomen: soft, not tender, not distended, positive BS; liver and spleen WNL  Genitourinary: no suprapubic tenderness  Lymphatic: no LN palpable  Musculoskeletal: no muscle tenderness, no joint swelling or tenderness  Extremities: no pedal edema RLE erythema, warmth, tenderness  Neurological/ Psychiatric:  moving all extremities  Skin: no rashes; no palpable lesions    Labs: all available labs reviewed                                   12.3   20.52 )-----------( 258      ( 30 Mar 2023 06:26 )             38.5     03-30    144  |  110<H>  |  42<H>  ----------------------------<  159<H>  3.9   |  31  |  1.24    Ca    9.0      30 Mar 2023 06:26  Phos  3.5     03-29  Mg     2.6     03-29    TPro  6.4  /  Alb  2.9<L>  /  TBili  0.6  /  DBili  x   /  AST  57<H>  /  ALT  91<H>  /  AlkPhos  78  03-30           Cultures:     Culture - Urine (03.25.23 @ 22:15)   Specimen Source: .Urine None  Culture Results:   10,000 - 49,000 CFU/mL Coag Negative Staphylococcus   "Susceptibilities not performed"Culture - Blood (03.25.23 @ 22:15)   Specimen Source: .Blood None  Culture Results:   No growth to date.Culture - Blood (03.25.23 @ 22:15)   Specimen Source: .Blood None  Culture Results:   No growth to date.        Radiology: all available radiological tests reviewed  < from: US Duplex Venous Lower Ext Complete, Bilateral (03.26.23 @ 13:48) >    ACC: [id number] EXAM:  US DPLX LWR EXT VEINS COMPL BI   ORDERED BY: HITESH JOHN     PROCEDURE DATE:  03/26/2023          INTERPRETATION:  CLINICAL INFORMATION: Lower extremity pain.    COMPARISON: None available.    TECHNIQUE: Duplex sonography of the BILATERAL LOWER extremity veins with   color and spectral Doppler, with and without compression.    FINDINGS:    RIGHT:  Normal compressibility of the RIGHT common femoral, femoral and popliteal   veins.  Doppler examination shows normal spontaneous and phasic flow.  There is long segment superficial venous thrombosis in the right greater   saphenous vein.  There is occlusive deep venous thrombosis in the right posterior tibial,   peroneal and soleal veins.    LEFT:  Normal compressibility of the LEFT common femoral, femoral and popliteal   veins.  Doppler examination shows normal spontaneous and phasic flow.  There is long segment superficial venous thrombosis in the left greater   saphenous vein.  There is occlusive deep venous thrombosis in the left posterior tibial,   peroneal and soleal veins.    IMPRESSION:  Extensive occlusive deep venous thrombosis in both calves, below the knee   only.  Long segment superficial venous thrombosis in the greater saphenous   bilaterally, above the knee bilaterally.        ACC: [id number] EXAM:  CT BRAIN   ORDERED BY: MAXIME TRUJILLO     PROCEDURE DATE:  03/25/2023          INTERPRETATION:  CLINICAL INFORMATION: Altered mental status    TECHNIQUE: Multiple axial CT images of the calvarium and brain were   obtained without contrast. Sagittal and coronal reformats were obtained.    COMPARISON: None    FINDINGS:    There is no mass effect, intracranial hemorrhage, or midline shift.    There is no CT evidence of acute territorial infarct.    Mild cerebral volume loss. Mild periventricular white matter lucencies   are nonspecific but likely sequela of microvascular ischemic disease.    The imaged portions of the paranasal sinuses and mastoids are well   aerated.    There is no osseous abnormality.    IMPRESSION:    No intracranial hemorrhage, mass effect, or midline shift.        ACC: [id number] EXAM:  XR CHEST 1 VIEW   ORDERED BY: MAXIME TRUJILLO     PROCEDURE DATE:  03/25/2023          INTERPRETATION:  Exam:XR CHEST    clinical history:Altered mental status    Heart size is normal. Lungs show no acute infiltrate. No pleural effusion.    IMPRESSION:  No active parenchymal disease in the chest.      Advanced directives addressed: full resuscitation

## 2023-03-30 NOTE — PROGRESS NOTE ADULT - ATTENDING COMMENTS
Patient seen and examined at the bedside. Please see resident note for full details regarding the service.     General: Awake and alert, cooperative with exam. No acute distress.   Cardiology: Normal S1, S2. Systolic ejection murmur. RRR.   Respiratory: Lungs CTABL. No wheezes, rales, or rhonchi.   Gastrointestinal: + BS. Soft. NT. ND. No guarding, rigidity, or rebound tenderness.  Extremities: No peripheral edema bilaterally. 2+ dorsalis pedis pulses. Right lower extremity with erythema, no warmth or tenderness.   Neurological: A+Ox3. CN 2-12 intact. No FND. Normal speech. No facial droop.   Psychiatric: Normal affect. Normal mood.     Assessment:   - AMS secondary to UTI   - Leukocytosis secondary to right lower extremity cellulitis   - Bilateral DVT   - Subtherapeutic INR (secondary to abx?)  - History of CHF, CKD, A fib, Severe aortic stenosis (followed by Dr. Bowling; scheduled for evaluation by CTsurg) multiple fall injuries and Dementia     Plan:   - c/w Ceftriaxone and Vancomycin -> will transition to PO doxycycline for 7 more days   - Cardio recs appreciated - outpt f/u with cardiology for aortic stenosis   - c/w Warfarin for b/l DVT   - INR 1.64 today, given an additional 1 mg of Warfarin -> recheck INR in AM   - D/C planning, anticipate 24-48 hours
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
## 2023-03-30 NOTE — PROGRESS NOTE ADULT - ASSESSMENT
76 yo Male presented with change in mental status, weakness and increased urination for 3-4 days. Patient has dementia so he is not a good historian. Daughter at the bedside provided the history. As per the Daughter, patient lives alone. She went to check on him last 2-3 days and found out he was getting more confuse and weak over the last few days. He was not taking his medications  regularly and was not able to control his urine. He was also having difficulty to move around. So yesterday daughter went to see him again and called 911 as his condition continued to get worse. No recent travel and no sick contact.  As per the daughter his legs are edematous. in last 2-3 days as he is not taking his meds regularly. Found to have b/l DVTs. Was given rocephin/doxy d/t concrn for superimposed cellulitis.     1. Bilateral DVT. RLE superimposed cellulitis. Leukocytosis  - imaging reviewed, wbc ct reactive  - vascular eval noted  - on ac  - change doxycycline to vancomycin 750mg q12 check trough prior to 4th dose #2  - on rocephin 1gm daily #5  - continue with abx coverage  - fu cultures  - no growth   - monitor temps  - tolerating abx well so far; no side effects noted  - reason for abx use and side effects reviewed with patient  - supportive care  - fu cbc    2. other issues - care per medicine

## 2023-03-31 LAB
ALBUMIN SERPL ELPH-MCNC: 2.8 G/DL — LOW (ref 3.3–5)
ALP SERPL-CCNC: 80 U/L — SIGNIFICANT CHANGE UP (ref 40–120)
ALT FLD-CCNC: 121 U/L — HIGH (ref 12–78)
ANION GAP SERPL CALC-SCNC: 4 MMOL/L — LOW (ref 5–17)
AST SERPL-CCNC: 82 U/L — HIGH (ref 15–37)
BILIRUB SERPL-MCNC: 0.4 MG/DL — SIGNIFICANT CHANGE UP (ref 0.2–1.2)
BUN SERPL-MCNC: 35 MG/DL — HIGH (ref 7–23)
CALCIUM SERPL-MCNC: 8.7 MG/DL — SIGNIFICANT CHANGE UP (ref 8.5–10.1)
CHLORIDE SERPL-SCNC: 110 MMOL/L — HIGH (ref 96–108)
CO2 SERPL-SCNC: 29 MMOL/L — SIGNIFICANT CHANGE UP (ref 22–31)
CREAT SERPL-MCNC: 0.9 MG/DL — SIGNIFICANT CHANGE UP (ref 0.5–1.3)
EGFR: 88 ML/MIN/1.73M2 — SIGNIFICANT CHANGE UP
GLUCOSE SERPL-MCNC: 138 MG/DL — HIGH (ref 70–99)
HCT VFR BLD CALC: 36.3 % — LOW (ref 39–50)
HGB BLD-MCNC: 11.6 G/DL — LOW (ref 13–17)
INR BLD: 1.87 RATIO — HIGH (ref 0.88–1.16)
MAGNESIUM SERPL-MCNC: 2.2 MG/DL — SIGNIFICANT CHANGE UP (ref 1.6–2.6)
MCHC RBC-ENTMCNC: 27.3 PG — SIGNIFICANT CHANGE UP (ref 27–34)
MCHC RBC-ENTMCNC: 32 GM/DL — SIGNIFICANT CHANGE UP (ref 32–36)
MCV RBC AUTO: 85.4 FL — SIGNIFICANT CHANGE UP (ref 80–100)
NT-PROBNP SERPL-SCNC: 3827 PG/ML — HIGH (ref 0–450)
PHOSPHATE SERPL-MCNC: 3.8 MG/DL — SIGNIFICANT CHANGE UP (ref 2.5–4.5)
PLATELET # BLD AUTO: 257 K/UL — SIGNIFICANT CHANGE UP (ref 150–400)
POTASSIUM SERPL-MCNC: 4 MMOL/L — SIGNIFICANT CHANGE UP (ref 3.5–5.3)
POTASSIUM SERPL-SCNC: 4 MMOL/L — SIGNIFICANT CHANGE UP (ref 3.5–5.3)
PROT SERPL-MCNC: 6 GM/DL — SIGNIFICANT CHANGE UP (ref 6–8.3)
PROTHROM AB SERPL-ACNC: 21.8 SEC — HIGH (ref 10.5–13.4)
RBC # BLD: 4.25 M/UL — SIGNIFICANT CHANGE UP (ref 4.2–5.8)
RBC # FLD: 14.6 % — HIGH (ref 10.3–14.5)
SODIUM SERPL-SCNC: 143 MMOL/L — SIGNIFICANT CHANGE UP (ref 135–145)
WBC # BLD: 17.05 K/UL — HIGH (ref 3.8–10.5)
WBC # FLD AUTO: 17.05 K/UL — HIGH (ref 3.8–10.5)

## 2023-03-31 PROCEDURE — 74176 CT ABD & PELVIS W/O CONTRAST: CPT | Mod: 26

## 2023-03-31 PROCEDURE — 71045 X-RAY EXAM CHEST 1 VIEW: CPT | Mod: 26

## 2023-03-31 PROCEDURE — 99233 SBSQ HOSP IP/OBS HIGH 50: CPT | Mod: GC

## 2023-03-31 RX ORDER — WARFARIN SODIUM 2.5 MG/1
4 TABLET ORAL DAILY
Refills: 0 | Status: DISCONTINUED | OUTPATIENT
Start: 2023-04-01 | End: 2023-04-02

## 2023-03-31 RX ORDER — WARFARIN SODIUM 2.5 MG/1
5 TABLET ORAL ONCE
Refills: 0 | Status: DISCONTINUED | OUTPATIENT
Start: 2023-03-31 | End: 2023-04-01

## 2023-03-31 RX ORDER — FUROSEMIDE 40 MG
20 TABLET ORAL ONCE
Refills: 0 | Status: COMPLETED | OUTPATIENT
Start: 2023-03-31 | End: 2023-03-31

## 2023-03-31 RX ORDER — SENNA PLUS 8.6 MG/1
2 TABLET ORAL AT BEDTIME
Refills: 0 | Status: DISCONTINUED | OUTPATIENT
Start: 2023-03-31 | End: 2023-04-03

## 2023-03-31 RX ADMIN — POLYETHYLENE GLYCOL 3350 17 GRAM(S): 17 POWDER, FOR SOLUTION ORAL at 11:58

## 2023-03-31 RX ADMIN — Medication 250 MILLIGRAM(S): at 22:36

## 2023-03-31 RX ADMIN — Medication 20 MILLIGRAM(S): at 11:57

## 2023-03-31 RX ADMIN — SENNA PLUS 2 TABLET(S): 8.6 TABLET ORAL at 22:35

## 2023-03-31 RX ADMIN — Medication 250 MILLIGRAM(S): at 11:58

## 2023-03-31 RX ADMIN — WARFARIN SODIUM 5 MILLIGRAM(S): 2.5 TABLET ORAL at 22:36

## 2023-03-31 RX ADMIN — Medication 50 MICROGRAM(S): at 06:04

## 2023-03-31 NOTE — PROGRESS NOTE ADULT - ATTENDING COMMENTS
Patient seen and examined at the bedside. Please see resident note for full details regarding the service.     General: Awake and alert, cooperative with exam. No acute distress.   Cardiology: Normal S1, S2. Systolic ejection murmur. RRR.   Respiratory: Lungs CTABL. No wheezes, rales, or rhonchi.   Gastrointestinal: + BS. No guarding or rebound tenderness. + diffuse abdominal tenderness and ridigity on exam.   Extremities: 2+ peripheral edema bilaterally. 2+ dorsalis pedis pulses. Right lower extremity with erythema, minimal warmth or tenderness.   Neurological: A+Ox3. CN 2-12 intact. No FND. Normal speech. No facial droop.   Psychiatric: Normal affect. Normal mood.     Assessment:   - AMS secondary to UTI   - Leukocytosis secondary to right lower extremity cellulitis   - Normocytic anemia   - Bilateral DVT   - Subtherapeutic INR (secondary to abx?)  - Transaminitis   - History of CHF, CKD, A fib, Severe aortic stenosis (followed by Dr. Bowling; scheduled for evaluation by CTsurg) multiple fall injuries and Dementia     Plan:   - c/w Ceftriaxone and Vancomycin -> will transition to PO doxycycline for 7 more days upon d/c   - Vancomycin level 9.6 -> will f/u with ID   - Cardio recs appreciated - outpt f/u with cardiology for aortic stenosis   - c/w Warfarin for b/l DVT   - INR 1.87 today -> c/w 4 mg Warfarin daily for now ordered daily and check AM PTT/PT/INR   - Ordered abdominal x-ray   - Will give 1 dose of lasix 20 mg IVP   - Bowel regimen   - D/C planning, anticipate 24-48 hours Patient seen and examined at the bedside. Please see resident note for full details regarding the service.     General: Awake and alert, cooperative with exam. No acute distress.   Cardiology: Normal S1, S2. Systolic ejection murmur. RRR.   Respiratory: Lungs CTABL. No wheezes, rales, or rhonchi.   Gastrointestinal: + BS. No guarding or rebound tenderness. + diffuse abdominal tenderness and ridigity on exam.   Extremities: 2+ peripheral edema bilaterally. 2+ dorsalis pedis pulses. Right lower extremity with erythema, minimal warmth or tenderness.   Neurological: A+Ox3. CN 2-12 intact. No FND. Normal speech. No facial droop.   Psychiatric: Normal affect. Normal mood.     Assessment:   - AMS secondary to UTI vs. cellulitis   - Leukocytosis secondary to right lower extremity cellulitis   - Normocytic anemia   - Bilateral DVT   - Subtherapeutic INR (secondary to abx?)  - Transaminitis   - History of CHF, CKD, A fib, Severe aortic stenosis (followed by Dr. Bowling; scheduled for evaluation by CTsurg) multiple fall injuries and Dementia     Plan:   - c/w Ceftriaxone and Vancomycin -> will transition to PO doxycycline for 7 more days upon d/c   - Vancomycin level 9.6 -> will f/u with ID   - Cardio recs appreciated - outpt f/u with cardiology for aortic stenosis   - c/w Warfarin for b/l DVT   - INR 1.87 today -> will give 5 mg total Warfarin today and c/w 4 mg Warfarin daily for now ordered daily and check AM PTT/PT/INR   - Ordered CT abd/pelvis w/o contrast, CXR, BNP   - Will give 1 dose of lasix 20 mg IVP   - Bowel regimen   - D/C planning, anticipate 24-48 hours
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
## 2023-04-01 DIAGNOSIS — R33.9 RETENTION OF URINE, UNSPECIFIED: ICD-10-CM

## 2023-04-01 LAB
ALBUMIN SERPL ELPH-MCNC: 2.6 G/DL — LOW (ref 3.3–5)
ALP SERPL-CCNC: 70 U/L — SIGNIFICANT CHANGE UP (ref 40–120)
ALT FLD-CCNC: 120 U/L — HIGH (ref 12–78)
ANION GAP SERPL CALC-SCNC: 6 MMOL/L — SIGNIFICANT CHANGE UP (ref 5–17)
AST SERPL-CCNC: 69 U/L — HIGH (ref 15–37)
BILIRUB SERPL-MCNC: 0.5 MG/DL — SIGNIFICANT CHANGE UP (ref 0.2–1.2)
BUN SERPL-MCNC: 31 MG/DL — HIGH (ref 7–23)
CALCIUM SERPL-MCNC: 8.6 MG/DL — SIGNIFICANT CHANGE UP (ref 8.5–10.1)
CHLORIDE SERPL-SCNC: 108 MMOL/L — SIGNIFICANT CHANGE UP (ref 96–108)
CO2 SERPL-SCNC: 29 MMOL/L — SIGNIFICANT CHANGE UP (ref 22–31)
CREAT SERPL-MCNC: 0.91 MG/DL — SIGNIFICANT CHANGE UP (ref 0.5–1.3)
EGFR: 87 ML/MIN/1.73M2 — SIGNIFICANT CHANGE UP
GLUCOSE SERPL-MCNC: 126 MG/DL — HIGH (ref 70–99)
HCT VFR BLD CALC: 34.2 % — LOW (ref 39–50)
HCT VFR BLD CALC: 34.3 % — LOW (ref 39–50)
HGB BLD-MCNC: 10.8 G/DL — LOW (ref 13–17)
HGB BLD-MCNC: 10.9 G/DL — LOW (ref 13–17)
INR BLD: 2.01 RATIO — HIGH (ref 0.88–1.16)
MAGNESIUM SERPL-MCNC: 2 MG/DL — SIGNIFICANT CHANGE UP (ref 1.6–2.6)
MCHC RBC-ENTMCNC: 26.8 PG — LOW (ref 27–34)
MCHC RBC-ENTMCNC: 31.9 GM/DL — LOW (ref 32–36)
MCV RBC AUTO: 84.2 FL — SIGNIFICANT CHANGE UP (ref 80–100)
PHOSPHATE SERPL-MCNC: 3.4 MG/DL — SIGNIFICANT CHANGE UP (ref 2.5–4.5)
PLATELET # BLD AUTO: 245 K/UL — SIGNIFICANT CHANGE UP (ref 150–400)
POTASSIUM SERPL-MCNC: 3.6 MMOL/L — SIGNIFICANT CHANGE UP (ref 3.5–5.3)
POTASSIUM SERPL-SCNC: 3.6 MMOL/L — SIGNIFICANT CHANGE UP (ref 3.5–5.3)
PROT SERPL-MCNC: 5.8 GM/DL — LOW (ref 6–8.3)
PROTHROM AB SERPL-ACNC: 23.5 SEC — HIGH (ref 10.5–13.4)
RBC # BLD: 4.06 M/UL — LOW (ref 4.2–5.8)
RBC # FLD: 14.7 % — HIGH (ref 10.3–14.5)
SARS-COV-2 RNA SPEC QL NAA+PROBE: SIGNIFICANT CHANGE UP
SODIUM SERPL-SCNC: 143 MMOL/L — SIGNIFICANT CHANGE UP (ref 135–145)
WBC # BLD: 17.03 K/UL — HIGH (ref 3.8–10.5)
WBC # FLD AUTO: 17.03 K/UL — HIGH (ref 3.8–10.5)

## 2023-04-01 PROCEDURE — 76770 US EXAM ABDO BACK WALL COMP: CPT | Mod: 26

## 2023-04-01 PROCEDURE — 99221 1ST HOSP IP/OBS SF/LOW 40: CPT

## 2023-04-01 PROCEDURE — 99233 SBSQ HOSP IP/OBS HIGH 50: CPT | Mod: GC

## 2023-04-01 RX ORDER — VANCOMYCIN HCL 1 G
1000 VIAL (EA) INTRAVENOUS ONCE
Refills: 0 | Status: COMPLETED | OUTPATIENT
Start: 2023-04-01 | End: 2023-04-01

## 2023-04-01 RX ORDER — FUROSEMIDE 40 MG
40 TABLET ORAL ONCE
Refills: 0 | Status: COMPLETED | OUTPATIENT
Start: 2023-04-01 | End: 2023-04-01

## 2023-04-01 RX ORDER — LIDOCAINE HCL 20 MG/ML
10 VIAL (ML) INJECTION ONCE
Refills: 0 | Status: DISCONTINUED | OUTPATIENT
Start: 2023-04-01 | End: 2023-04-03

## 2023-04-01 RX ORDER — VANCOMYCIN HCL 1 G
1000 VIAL (EA) INTRAVENOUS EVERY 12 HOURS
Refills: 0 | Status: DISCONTINUED | OUTPATIENT
Start: 2023-04-01 | End: 2023-04-03

## 2023-04-01 RX ORDER — IBUPROFEN 200 MG
400 TABLET ORAL ONCE
Refills: 0 | Status: COMPLETED | OUTPATIENT
Start: 2023-04-01 | End: 2023-04-01

## 2023-04-01 RX ORDER — LIDOCAINE HCL 20 MG/ML
10 VIAL (ML) INJECTION ONCE
Refills: 0 | Status: COMPLETED | OUTPATIENT
Start: 2023-04-01 | End: 2023-04-01

## 2023-04-01 RX ADMIN — Medication 40 MILLIGRAM(S): at 16:30

## 2023-04-01 RX ADMIN — Medication 400 MILLIGRAM(S): at 05:53

## 2023-04-01 RX ADMIN — Medication 250 MILLIGRAM(S): at 16:29

## 2023-04-01 RX ADMIN — Medication 650 MILLIGRAM(S): at 23:30

## 2023-04-01 RX ADMIN — POLYETHYLENE GLYCOL 3350 17 GRAM(S): 17 POWDER, FOR SOLUTION ORAL at 16:31

## 2023-04-01 RX ADMIN — WARFARIN SODIUM 4 MILLIGRAM(S): 2.5 TABLET ORAL at 23:04

## 2023-04-01 RX ADMIN — Medication 50 MICROGRAM(S): at 05:53

## 2023-04-01 RX ADMIN — Medication 250 MILLIGRAM(S): at 23:04

## 2023-04-01 RX ADMIN — Medication 400 MILLIGRAM(S): at 06:30

## 2023-04-01 RX ADMIN — Medication 650 MILLIGRAM(S): at 23:05

## 2023-04-01 RX ADMIN — CEFTRIAXONE 1000 MILLIGRAM(S): 500 INJECTION, POWDER, FOR SOLUTION INTRAMUSCULAR; INTRAVENOUS at 00:04

## 2023-04-01 RX ADMIN — Medication 3 MILLIGRAM(S): at 23:04

## 2023-04-01 RX ADMIN — SENNA PLUS 2 TABLET(S): 8.6 TABLET ORAL at 23:04

## 2023-04-01 RX ADMIN — Medication 10 MILLILITER(S): at 02:46

## 2023-04-01 NOTE — CONSULT NOTE ADULT - REASON FOR ADMISSION
Change in mental status

## 2023-04-01 NOTE — CONSULT NOTE ADULT - PROBLEM SELECTOR RECOMMENDATION 9
-Severe AS on echo (prelim).  -LE edema suggests mild hypervolemia    -Agree w/ diuretic therapy - daughter states pt's compliance w/ diuretics is poor.  -Await call from Dr. Skaggs.  Most likely further evaluation  of AS severity & valve surgery can be done as an OP.  -May be a poor candidate for surgery given dementia.
I recommend that he have a chronic yanes, irrigate PRN and change q 1 month

## 2023-04-01 NOTE — PROGRESS NOTE ADULT - ATTENDING COMMENTS
Patient seen and examined at the bedside. Please see resident note for full details regarding the service.     General: Awake and alert, cooperative with exam. No acute distress.   Cardiology: Normal S1, S2. Systolic ejection murmur. RRR.   Respiratory: Lungs CTABL. No wheezes, rales, or rhonchi.   Gastrointestinal: + BS. No guarding or rebound tenderness. + diffuse abdominal tenderness and ridigity on exam.   Extremities: 2+ peripheral edema bilaterally. 2+ dorsalis pedis pulses. Right lower extremity with erythema, minimal warmth or tenderness.   Neurological: A+Ox2. CN 2-12 intact. No FND. Normal speech. No facial droop.   Psychiatric: Normal affect. Normal mood.     Assessment:   - AMS secondary to UTI vs. cellulitis   - Leukocytosis secondary to right lower extremity cellulitis   - Normocytic anemia   - Bilateral DVT   - Subtherapeutic INR (secondary to abx?)  - Transaminitis   - Urinary retention   - History of CHF, CKD, A fib, Severe aortic stenosis (followed by Dr. Bowling; scheduled for evaluation by CTsurg) multiple fall injuries and Dementia     Plan:   - c/w Ceftriaxone and Vancomycin -> plan to transition to PO doxycycline for 7 more days upon d/c   - Vancomycin level 9.6 -> will increase to 1g Q12H   - Cardio recs appreciated - outpt f/u with cardiology for aortic stenosis   - c/w Warfarin for b/l DVT   - INR 2.01 today ->  c/w 4 mg Warfarin daily for now ordered daily and check AM PTT/PT/INR   - CT abd/pelvis: Markedly distended urinary bladder causing mild bilateral hydroureteronephrosis. Moderate prostatomegaly. 8mm left lower lobe pulmonary nodule. Recommend dedicated noncontrast chest CT to assess for possible additional nodules.  - RUQ: Álvarez catheter within collapsed urinary bladder and diminished bilateral hydronephrosis  - Álvarez catheter placed yesterday, pt with hematuria likely from traumatic insertion   - Urology consulted   - Ordered CT chest given lung nodule   - May need outpt Heme/Onc f/u outpt for persistent leukocytosis    - Hb downtrending from 14 on admission to 10.9 -> ordered stool occult  - Will give 40 mg IVP lasix today for lower extremity swelling   - Bowel regimen   - D/C planning Patient seen and examined at the bedside. Please see resident note for full details regarding the service.     General: Awake and alert, cooperative with exam. No acute distress.   Cardiology: Normal S1, S2. Systolic ejection murmur. RRR.   Respiratory: Lungs CTABL. No wheezes, rales, or rhonchi.   Gastrointestinal: + BS. No guarding or rebound tenderness. + diffuse abdominal tenderness and ridigity on exam.   Extremities: 2+ peripheral edema bilaterally. 2+ dorsalis pedis pulses. Right lower extremity with erythema, minimal warmth or tenderness.   Neurological: A+Ox2. CN 2-12 intact. No FND. Normal speech. No facial droop.   Psychiatric: Normal affect. Normal mood.     Assessment:   - AMS secondary to UTI vs. cellulitis   - Leukocytosis secondary to right lower extremity cellulitis   - Normocytic anemia   - Bilateral DVT   - Subtherapeutic INR (secondary to abx?)  - Transaminitis   - Urinary retention   - History of CHF, CKD, A fib, Severe aortic stenosis (followed by Dr. Bowling; scheduled for evaluation by CTsurg) multiple fall injuries and Dementia     Plan:   - c/w Ceftriaxone and Vancomycin -> plan to transition to PO doxycycline for 7 more days upon d/c   - Vancomycin level 9.6 -> will increase to 1g Q12H   - Cardio recs appreciated - outpt f/u with cardiology for aortic stenosis   - c/w Warfarin for b/l DVT   - INR 2.01 today ->  c/w 4 mg Warfarin daily for now ordered daily and check AM PTT/PT/INR   - CT abd/pelvis: Markedly distended urinary bladder causing mild bilateral hydroureteronephrosis. Moderate prostatomegaly. 8mm left lower lobe pulmonary nodule. Recommend dedicated noncontrast chest CT to assess for possible additional nodules.  - US kidneys/bladder: Álvarez catheter within collapsed urinary bladder and diminished bilateral hydronephrosis  - Álvarez catheter placed yesterday, pt with hematuria likely from traumatic insertion   - Urology consulted   - Ordered CT chest given lung nodule   - May need outpt Heme/Onc f/u outpt for persistent leukocytosis    - Hb downtrending from 14 on admission to 10.9 -> ordered stool occult  - Will give 40 mg IVP lasix today for lower extremity swelling   - Bowel regimen   - D/C planning

## 2023-04-01 NOTE — PROVIDER CONTACT NOTE (OTHER) - REASON
Unable to Insert Double lumen Álvarez Cath.
2 seconds pause on tele monitoring
Patient with urinary retention and unable to complete straight cath.

## 2023-04-01 NOTE — PROVIDER CONTACT NOTE (OTHER) - ASSESSMENT
Also recommend Lidocaine jelly to be applied prior to yanes insertion.
Pt was assessed, asymptomatic, sleeping.

## 2023-04-01 NOTE — CONSULT NOTE ADULT - SUBJECTIVE AND OBJECTIVE BOX
CHIEF COMPLAINT:Retention    HISTORY OF PRESENT ILLNESS:1800cc resdial urine    PAST MEDICAL & SURGICAL HISTORY:  No pertinent past medical history          REVIEW OF SYSTEMS:    CONSTITUTIONAL: No weakness, fevers or chills  EYES/ENT: No visual changes;  No vertigo or throat pain   NECK: No pain or stiffness  RESPIRATORY: No cough, wheezing, hemoptysis; No shortness of breath  CARDIOVASCULAR: No chest pain or palpitations  GASTROINTESTINAL: No abdominal or epigastric pain. No nausea, vomiting, or hematemesis; No diarrhea or constipation. No melena or hematochezia.  GENITOURINARY: No dysuria, frequency or hematuria  NEUROLOGICAL: No numbness or weakness  SKIN: No itching, burning, rashes, or lesions   All other review of systems is negative unless indicated above.    MEDICATIONS  (STANDING):  cefTRIAXone Injectable. 1000 milliGRAM(s) IV Push every 24 hours  furosemide   Injectable 40 milliGRAM(s) IV Push once  levothyroxine 50 MICROGram(s) Oral daily  lidocaine 2% Jelly 10 milliLiter(s) IntraUrethral once  polyethylene glycol 3350 17 Gram(s) Oral daily  senna 2 Tablet(s) Oral at bedtime  vancomycin  IVPB 1000 milliGRAM(s) IV Intermittent once  vancomycin  IVPB 1000 milliGRAM(s) IV Intermittent every 12 hours  warfarin 5 milliGRAM(s) Oral once  warfarin 4 milliGRAM(s) Oral daily    MEDICATIONS  (PRN):  acetaminophen     Tablet .. 650 milliGRAM(s) Oral every 6 hours PRN Temp greater or equal to 38C (100.4F), Mild Pain (1 - 3)  aluminum hydroxide/magnesium hydroxide/simethicone Suspension 30 milliLiter(s) Oral every 4 hours PRN Dyspepsia  melatonin 3 milliGRAM(s) Oral at bedtime PRN Insomnia  ondansetron Injectable 4 milliGRAM(s) IV Push every 8 hours PRN Nausea and/or Vomiting      Allergies    No Known Allergies    Intolerances        SOCIAL HISTORY:    FAMILY HISTORY:  No pertinent family history in first degree relatives        Vital Signs Last 24 Hrs  T(C): 36.9 (01 Apr 2023 08:24), Max: 36.9 (01 Apr 2023 08:24)  T(F): 98.4 (01 Apr 2023 08:24), Max: 98.4 (01 Apr 2023 08:24)  HR: 89 (01 Apr 2023 08:24) (74 - 89)  BP: 105/70 (01 Apr 2023 08:24) (105/70 - 113/65)  BP(mean): --  RR: 18 (01 Apr 2023 08:24) (17 - 18)  SpO2: 97% (01 Apr 2023 08:24) (97% - 97%)    Parameters below as of 01 Apr 2023 08:24  Patient On (Oxygen Delivery Method): room air        PHYSICAL EXAM:    Constitutional: NAD, well-developed  HEENT: TRISTEN, EOMI, Normal Hearing, MMM  Neck: No LAD, No JVD  Back: Normal spine flexure, No CVA tenderness  Respiratory: CTAB   Cardiovascular: S1 and S2, RRR, no M/G/R  Abd: BS+, soft, NT/ND, No CVAT  : Normal phallus,open meatus,bilateral descended testes, no masses  CARMENCITA: Normal prostate, no masses  Extremities: No peripheral edema  Vascular: 2+ peripheral pulses  Neurological: A/O x 3, no focal deficits  Psychiatric: Normal mood, normal affect  Musculoskeletal: 5/5 strength b/l upper and lower extremities  Skin: No rashes    LABS:                        10.9   17.03 )-----------( 245      ( 01 Apr 2023 09:10 )             34.2     04-01    143  |  108  |  31<H>  ----------------------------<  126<H>  3.6   |  29  |  0.91    Ca    8.6      01 Apr 2023 09:10  Phos  3.4     04-01  Mg     2.0     04-01    TPro  5.8<L>  /  Alb  2.6<L>  /  TBili  0.5  /  DBili  x   /  AST  69<H>  /  ALT  120<H>  /  AlkPhos  70  04-01    PT/INR - ( 01 Apr 2023 09:10 )   PT: 23.5 sec;   INR: 2.01 ratio             Urine Culture:     RADIOLOGY & ADDITIONAL STUDIES:

## 2023-04-01 NOTE — PROVIDER CONTACT NOTE (OTHER) - SITUATION
Patient admitted with AMS and UTI. Patient also with urinary retention. Unable to successfully insert Double lumen indwelling yanes cath. .
Tele mckenzie called to unit for 2 sec pause. Pt was asymptomatic, sleeping.
Patient admitted with UTI, and AMS. Patient also with urinary retention. Patient with bladder volume greater than 1400ml.

## 2023-04-01 NOTE — PROVIDER CONTACT NOTE (OTHER) - RECOMMENDATIONS
For patient to receive coude cath.
Due to the amount of volume in bladder, frequency of urinary retention, and patient's tolerance for straight cath, yanes cath. should be inserted.
Close monitoring continues.

## 2023-04-01 NOTE — PROVIDER CONTACT NOTE (OTHER) - ACTION/TREATMENT ORDERED:
Provider to contact surgical PA for insertion of coude cath.
Close monitoring continues.
Order obtained for lidocaine jelly and for Álvarez Cath. insertion.
Implemented All Universal Safety Interventions:  [place] to call system. Call bell, personal items and telephone within reach. Instruct patient to call for assistance. Room bathroom lighting operational. Non-slip footwear when patient is off stretcher. Physically safe environment: no spills, clutter or unnecessary equipment. Stretcher in lowest position, wheels locked, appropriate side rails in place.
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
## 2023-04-01 NOTE — PROVIDER CONTACT NOTE (OTHER) - BACKGROUND
Pt admitted with UTI, has history of A fib, CHF, CKD, dementia, on tele monitoring.
Patient with prior episode of straight cath. on day shift for bladder volume greater than 1400ml. Patient screaming and stating " I don't want this to be done again" .

## 2023-04-02 LAB
ANION GAP SERPL CALC-SCNC: 1 MMOL/L — LOW (ref 5–17)
BASOPHILS # BLD AUTO: 0.06 K/UL — SIGNIFICANT CHANGE UP (ref 0–0.2)
BASOPHILS NFR BLD AUTO: 0.4 % — SIGNIFICANT CHANGE UP (ref 0–2)
BUN SERPL-MCNC: 30 MG/DL — HIGH (ref 7–23)
CALCIUM SERPL-MCNC: 8.7 MG/DL — SIGNIFICANT CHANGE UP (ref 8.5–10.1)
CHLORIDE SERPL-SCNC: 108 MMOL/L — SIGNIFICANT CHANGE UP (ref 96–108)
CO2 SERPL-SCNC: 34 MMOL/L — HIGH (ref 22–31)
CREAT SERPL-MCNC: 0.78 MG/DL — SIGNIFICANT CHANGE UP (ref 0.5–1.3)
EGFR: 92 ML/MIN/1.73M2 — SIGNIFICANT CHANGE UP
EOSINOPHIL # BLD AUTO: 0.46 K/UL — SIGNIFICANT CHANGE UP (ref 0–0.5)
EOSINOPHIL NFR BLD AUTO: 3 % — SIGNIFICANT CHANGE UP (ref 0–6)
GLUCOSE SERPL-MCNC: 127 MG/DL — HIGH (ref 70–99)
HCT VFR BLD CALC: 34.8 % — LOW (ref 39–50)
HGB BLD-MCNC: 11 G/DL — LOW (ref 13–17)
IMM GRANULOCYTES NFR BLD AUTO: 0.7 % — SIGNIFICANT CHANGE UP (ref 0–0.9)
INR BLD: 2.32 RATIO — HIGH (ref 0.88–1.16)
LYMPHOCYTES # BLD AUTO: 0.8 K/UL — LOW (ref 1–3.3)
LYMPHOCYTES # BLD AUTO: 5.2 % — LOW (ref 13–44)
MCHC RBC-ENTMCNC: 27.4 PG — SIGNIFICANT CHANGE UP (ref 27–34)
MCHC RBC-ENTMCNC: 31.6 GM/DL — LOW (ref 32–36)
MCV RBC AUTO: 86.8 FL — SIGNIFICANT CHANGE UP (ref 80–100)
MONOCYTES # BLD AUTO: 1.32 K/UL — HIGH (ref 0–0.9)
MONOCYTES NFR BLD AUTO: 8.6 % — SIGNIFICANT CHANGE UP (ref 2–14)
NEUTROPHILS # BLD AUTO: 12.66 K/UL — HIGH (ref 1.8–7.4)
NEUTROPHILS NFR BLD AUTO: 82.1 % — HIGH (ref 43–77)
PLATELET # BLD AUTO: 264 K/UL — SIGNIFICANT CHANGE UP (ref 150–400)
POTASSIUM SERPL-MCNC: 3.8 MMOL/L — SIGNIFICANT CHANGE UP (ref 3.5–5.3)
POTASSIUM SERPL-SCNC: 3.8 MMOL/L — SIGNIFICANT CHANGE UP (ref 3.5–5.3)
PROTHROM AB SERPL-ACNC: 27.2 SEC — HIGH (ref 10.5–13.4)
RBC # BLD: 4.01 M/UL — LOW (ref 4.2–5.8)
RBC # FLD: 14.6 % — HIGH (ref 10.3–14.5)
SODIUM SERPL-SCNC: 143 MMOL/L — SIGNIFICANT CHANGE UP (ref 135–145)
VANCOMYCIN TROUGH SERPL-MCNC: 5.1 UG/ML — LOW (ref 10–20)
WBC # BLD: 15.41 K/UL — HIGH (ref 3.8–10.5)
WBC # FLD AUTO: 15.41 K/UL — HIGH (ref 3.8–10.5)

## 2023-04-02 PROCEDURE — 99233 SBSQ HOSP IP/OBS HIGH 50: CPT | Mod: GC

## 2023-04-02 RX ORDER — WARFARIN SODIUM 2.5 MG/1
2.5 TABLET ORAL DAILY
Refills: 0 | Status: DISCONTINUED | OUTPATIENT
Start: 2023-04-02 | End: 2023-04-03

## 2023-04-02 RX ORDER — FUROSEMIDE 40 MG
20 TABLET ORAL DAILY
Refills: 0 | Status: DISCONTINUED | OUTPATIENT
Start: 2023-04-02 | End: 2023-04-03

## 2023-04-02 RX ORDER — SPIRONOLACTONE 25 MG/1
25 TABLET, FILM COATED ORAL DAILY
Refills: 0 | Status: DISCONTINUED | OUTPATIENT
Start: 2023-04-02 | End: 2023-04-03

## 2023-04-02 RX ADMIN — WARFARIN SODIUM 2.5 MILLIGRAM(S): 2.5 TABLET ORAL at 21:35

## 2023-04-02 RX ADMIN — Medication 50 MICROGRAM(S): at 06:02

## 2023-04-02 RX ADMIN — Medication 3 MILLIGRAM(S): at 21:35

## 2023-04-02 RX ADMIN — CEFTRIAXONE 1000 MILLIGRAM(S): 500 INJECTION, POWDER, FOR SOLUTION INTRAMUSCULAR; INTRAVENOUS at 00:15

## 2023-04-02 RX ADMIN — SENNA PLUS 2 TABLET(S): 8.6 TABLET ORAL at 21:35

## 2023-04-02 RX ADMIN — SPIRONOLACTONE 25 MILLIGRAM(S): 25 TABLET, FILM COATED ORAL at 12:57

## 2023-04-02 RX ADMIN — Medication 20 MILLIGRAM(S): at 12:57

## 2023-04-02 RX ADMIN — Medication 250 MILLIGRAM(S): at 09:35

## 2023-04-02 RX ADMIN — Medication 250 MILLIGRAM(S): at 21:35
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
## 2023-04-02 NOTE — PROGRESS NOTE ADULT - ATTENDING COMMENTS
77 year old male with a PMH of CHF, CKD, Afib and Dementia presented with change in mental status, weakness and increased urination for 3-4 days.      #Hydroureteronephrosis  #moderate prostatomegaly  -CT A/P without contrast to r/o GI infection: + marked distended urinary bladder +mild b/l hydroureteronephrosis +moderate prostatomegaly   -Bladder scanned and found to have 1000cc; Straight cathed yesterday  - 1400 cc overnight. unable to insert yanes, caude catheter placed with surgery.   - Urology following, recs appreciated: c/w chronic yanes  -Renal US: +yanes catheter and improving b/l hydronephrosis   -Start home dose of spironolactone  -PO Lasix 20 mg QD

## 2023-04-02 NOTE — PROGRESS NOTE ADULT - NUTRITIONAL ASSESSMENT
This patient has been assessed with a concern for Malnutrition and has been determined to have a diagnosis/diagnoses of Severe protein-calorie malnutrition.    This patient is being managed with:   Diet DASH/TLC-  Sodium & Cholesterol Restricted  Entered: Mar 26 2023  1:04AM  
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
## 2023-04-02 NOTE — PROGRESS NOTE ADULT - PROVIDER SPECIALTY LIST ADULT
Family Medicine
Vascular Surgery
Family Medicine
Family Medicine
Infectious Disease

## 2023-04-03 ENCOUNTER — TRANSCRIPTION ENCOUNTER (OUTPATIENT)
Age: 78
End: 2023-04-03

## 2023-04-03 VITALS
DIASTOLIC BLOOD PRESSURE: 73 MMHG | HEART RATE: 77 BPM | OXYGEN SATURATION: 96 % | TEMPERATURE: 98 F | SYSTOLIC BLOOD PRESSURE: 128 MMHG | RESPIRATION RATE: 18 BRPM

## 2023-04-03 LAB
ALBUMIN SERPL ELPH-MCNC: 2.6 G/DL — LOW (ref 3.3–5)
ALP SERPL-CCNC: 72 U/L — SIGNIFICANT CHANGE UP (ref 40–120)
ALT FLD-CCNC: 132 U/L — HIGH (ref 12–78)
ANION GAP SERPL CALC-SCNC: 4 MMOL/L — LOW (ref 5–17)
AST SERPL-CCNC: 68 U/L — HIGH (ref 15–37)
BILIRUB SERPL-MCNC: 0.5 MG/DL — SIGNIFICANT CHANGE UP (ref 0.2–1.2)
BUN SERPL-MCNC: 22 MG/DL — SIGNIFICANT CHANGE UP (ref 7–23)
CALCIUM SERPL-MCNC: 8.8 MG/DL — SIGNIFICANT CHANGE UP (ref 8.5–10.1)
CHLORIDE SERPL-SCNC: 108 MMOL/L — SIGNIFICANT CHANGE UP (ref 96–108)
CO2 SERPL-SCNC: 30 MMOL/L — SIGNIFICANT CHANGE UP (ref 22–31)
CREAT SERPL-MCNC: 0.62 MG/DL — SIGNIFICANT CHANGE UP (ref 0.5–1.3)
EGFR: 98 ML/MIN/1.73M2 — SIGNIFICANT CHANGE UP
GLUCOSE SERPL-MCNC: 111 MG/DL — HIGH (ref 70–99)
HCT VFR BLD CALC: 35.1 % — LOW (ref 39–50)
HGB BLD-MCNC: 11.3 G/DL — LOW (ref 13–17)
INR BLD: 2.31 RATIO — HIGH (ref 0.88–1.16)
MAGNESIUM SERPL-MCNC: 1.8 MG/DL — SIGNIFICANT CHANGE UP (ref 1.6–2.6)
MCHC RBC-ENTMCNC: 27.3 PG — SIGNIFICANT CHANGE UP (ref 27–34)
MCHC RBC-ENTMCNC: 32.2 GM/DL — SIGNIFICANT CHANGE UP (ref 32–36)
MCV RBC AUTO: 84.8 FL — SIGNIFICANT CHANGE UP (ref 80–100)
PHOSPHATE SERPL-MCNC: 2.5 MG/DL — SIGNIFICANT CHANGE UP (ref 2.5–4.5)
PLATELET # BLD AUTO: 295 K/UL — SIGNIFICANT CHANGE UP (ref 150–400)
POTASSIUM SERPL-MCNC: 3.9 MMOL/L — SIGNIFICANT CHANGE UP (ref 3.5–5.3)
POTASSIUM SERPL-SCNC: 3.9 MMOL/L — SIGNIFICANT CHANGE UP (ref 3.5–5.3)
PROT SERPL-MCNC: 5.8 GM/DL — LOW (ref 6–8.3)
PROTHROM AB SERPL-ACNC: 27 SEC — HIGH (ref 10.5–13.4)
RBC # BLD: 4.14 M/UL — LOW (ref 4.2–5.8)
RBC # FLD: 14.6 % — HIGH (ref 10.3–14.5)
SODIUM SERPL-SCNC: 142 MMOL/L — SIGNIFICANT CHANGE UP (ref 135–145)
WBC # BLD: 14.58 K/UL — HIGH (ref 3.8–10.5)
WBC # FLD AUTO: 14.58 K/UL — HIGH (ref 3.8–10.5)

## 2023-04-03 PROCEDURE — 99239 HOSP IP/OBS DSCHRG MGMT >30: CPT | Mod: GC

## 2023-04-03 RX ADMIN — SPIRONOLACTONE 25 MILLIGRAM(S): 25 TABLET, FILM COATED ORAL at 09:07

## 2023-04-03 RX ADMIN — Medication 250 MILLIGRAM(S): at 09:07

## 2023-04-03 RX ADMIN — Medication 20 MILLIGRAM(S): at 09:07

## 2023-04-03 RX ADMIN — Medication 50 MICROGRAM(S): at 05:13

## 2023-04-03 NOTE — DISCHARGE NOTE PROVIDER - CARE PROVIDER_API CALL
Gurwinder Estevez)  Urology  [address], [address] [place] [address]  Phone: [phone]  Fax: [phone]  Follow Up Time: 1 month

## 2023-04-03 NOTE — DISCHARGE NOTE PROVIDER - NSDCHHNEEDSERVICE_GEN_ALL_CORE
Catheter insertion/Medication teaching and assessment/Teaching and training/Wound care and assessment

## 2023-04-03 NOTE — DISCHARGE NOTE PROVIDER - NSDCCAREPROVSEEN_GEN_ALL_CORE_FT
Wilma, Papi Galeas, Thad Burger, Dinorah Estevez, Gurwinder Nelson, Hola Perez, Dior Gordillo, Flo Orellana, Aaron Gramajo, Sosa Serna, Mike Arias, Simi

## 2023-04-03 NOTE — DISCHARGE NOTE PROVIDER - NSDCMRMEDTOKEN_GEN_ALL_CORE_FT
furosemide 40 mg oral tablet: 1 orally once a day  levothyroxine 50 mcg (0.05 mg) oral tablet: 1 orally once a day  Multiple Vitamins oral tablet: 1 orally once a day  spironolactone 25 mg oral tablet: 1 orally once a day  warfarin 2.5 mg oral tablet: 1 orally once a day   doxycycline hyclate 100 mg oral tablet: 1 tab(s) orally 2 times a day  furosemide 40 mg oral tablet: 1 orally once a day  levothyroxine 50 mcg (0.05 mg) oral tablet: 1 orally once a day  Multiple Vitamins oral tablet: 1 orally once a day  spironolactone 25 mg oral tablet: 1 orally once a day  warfarin 2.5 mg oral tablet: 1 orally once a day

## 2023-04-03 NOTE — DISCHARGE NOTE PROVIDER - NSDCCPCAREPLAN_GEN_ALL_CORE_FT
PRINCIPAL DISCHARGE DIAGNOSIS  Diagnosis: Cellulitis  Assessment and Plan of Treatment: Take the prescribed antibiotic medicine (doxycycline 100 mg)  for 7 more days  you are given as directed until it is gone. Take it even if you feel better. It treats the infection and stops it from returning. Not taking all the medicine can make future infections hard to treat. Keep the infected area clean. When possible, raise the infected area above the level of your heart. This helps keep swelling down. Talk with your healthcare provider if you are in pain. Ask what kind of over-the-counter medicine you can take for pain. Apply clean bandages as advised.  Wash your hands often to prevent spreading the infection. In the future, wash your hands before and after you touch cuts, scratches, or bandages. This will help prevent infection.   Call your healthcare provider immediately if you have any of the following: Difficulty or pain when moving the joints above or below the infected area, Discharge or pus draining from the area, rever of 100.4°F (38°C) or higher, or as directed by your healthcare provider, pain that gets worse in or around the infected site, redness that gets worse in or around the infected area, particularly if the area of redness expands to a wider area, shaking chills, swelling of the infected area, vomiting.        SECONDARY DISCHARGE DIAGNOSES  Diagnosis: Hydroureteronephrosis  Assessment and Plan of Treatment:   HOME CARE INSTRUCTIONS  Get lots of rest.  Drink enough fluid to keep your urine clear or pale yellow.  If you have a drain in, follow your health care provider's instructions about how to care for it.  Take medicines only as directed by your health care provider.  If you were prescribed an antibiotic medicine, finish all of it even if you start to feel better.  Keep all follow-up visits as directed by your health care provider. This is important.  You were given a chronic yanes. Follow up with urology. You have to flush the catheter prn, and change the yanes catheter monthy.   SEEK MEDICAL CARE IF:  You continue to have symptoms after treatment.  You develop new symptoms.  You have a problem with a drainage device.  Your urine becomes cloudy or bloody.  You have a fever.  ADDITIONAL NOTES AND INSTRUCTIONS  Please follow up with your Primary MD in 24-48 hr.

## 2023-04-03 NOTE — DISCHARGE NOTE NURSING/CASE MANAGEMENT/SOCIAL WORK - PATIENT PORTAL LINK FT
You can access the FollowMyHealth Patient Portal offered by [facility] by registering at the following website: [email or web address]. By joining "[employer]’s FollowMyHealth portal, you will also be able to view your health information using other applications (apps) compatible with our system.

## 2023-04-03 NOTE — DISCHARGE NOTE PROVIDER - HOSPITAL COURSE
77 year old male with a PMH of CHF, CKD, Afib, Severe aortic stenosis (followed by Dr. Bowling; scheduled for evaluation by CTsurg) multiple fall injuries and Dementia presented with change in mental status, weakness and increased urination for 3-4 days. Urology was consulted. CT abdomen and pevis showed b/l hydroureteronephrosis with obstruction. Patient was given a chronic yanes, with recommendations to irrigate PRN, and change once a month. Patient was placed on home dose of 40mg daily. Coumadin was set to 2.5mg. Ct chest showed 8mm lung nodule. Infectious disease was consulted, Patient was given on doxycycline, changed to Vancomycin and continued on Rocephin for 5 days. Cardiology was consulted, and lasix was restarted at home dose. Cardiology recommended that they will f/u with severe AS as outpatient. Daughters Florinda (main guardian) and Yuko are guardians of the patient. Spoke with Yuko on the phone and stated that she will notify Florinda to bring in guardianship paperwork. Possibly discuss GOC and HCP. Patient with history of Dementia currently living alone and not responding to phone calls, multiple fall injuries in the past and recently scammed out of all of his money, per daughter Yuko. Daughters interested in possible placement or home care. Case management and Social Work following, recs appreciated. Patient to be d/c'd to [facility]. Patient medically stable at the time of discharge. Patient seen and examined.    77 year old male with a PMH of CHF, CKD, Afib, Severe aortic stenosis (followed by Dr. Bowling; scheduled for evaluation by CTsurg) multiple fall injuries and Dementia presented with change in mental status, weakness and increased urination for 3-4 days. Urology was consulted. CT abdomen and pevis showed b/l hydroureteronephrosis with obstruction. Patient was given a chronic yanes, with recommendations to irrigate PRN, and change once a month. Patient was placed on home dose of 40mg daily. Coumadin was set to 2.5mg. Ct chest showed 8mm lung nodule. Infectious disease was consulted, Patient was given on doxycycline, changed to Vancomycin and continued on Rocephin for 5 days. Cardiology was consulted, and lasix was restarted at home dose. Cardiology recommended that they will f/u with severe AS as outpatient. Daughters Florinda (main guardian) and Yuko are guardians of the patient. Spoke with Yuko on the phone and stated that she will notify Florinda to bring in guardianship paperwork. Possibly discuss GOC and HCP. Patient with history of Dementia currently living alone and not responding to phone calls, multiple fall injuries in the past and recently scammed out of all of his money, per daughter Yuko. Daughters interested in possible placement or home care. Case management and Social Work following, recs appreciated. Patient to be d/c'd to [facility]. Patient medically stable at the time of discharge. Patient seen and examined. Patient will be discharged with seven more days of doxycycline 100 mg and will continue with home dose of warfarin. We recommend follow-up with primary care provider one week upon discharge.     PHYSICAL EXAM:  Vital Signs Last 24 Hrs  T(C): 36.5 (03 Apr 2023 08:07), Max: 36.9 (02 Apr 2023 15:47)  T(F): 97.7 (03 Apr 2023 08:07), Max: 98.4 (02 Apr 2023 15:47)  HR: 77 (03 Apr 2023 08:07) (58 - 108)  BP: 128/73 (03 Apr 2023 08:07) (128/73 - 161/65)  BP(mean): --  RR: 18 (03 Apr 2023 08:07) (18 - 18)  SpO2: 96% (03 Apr 2023 08:07) (96% - 98%)    Parameters below as of 03 Apr 2023 08:07  Patient On (Oxygen Delivery Method): room air    Constitutional: Pt lying in bed, awake and alert, NAD  HEENT: EOMI, normal hearing, moist mucous membranes  Neck: Soft and supple, no JVD  Respiratory: CTABL, No wheezing, rales or rhonchi  Cardiovascular: S1S2+, RRR, no M/G/R  Gastrointestinal: BS+, soft, NT/ND, no guarding, no rebound  Extremities: No peripheral edema  Vascular: 2+ peripheral pulses  Neurological: AAOx3, no focal deficits  Musculoskeletal: 5/5 strength b/l upper and lower extremities  Skin: No rashes    LABS: All Labs Reviewed:                        11.3   14.58 )-----------( 295      ( 03 Apr 2023 08:09 )             35.1     04-03    142  |  108  |  22  ----------------------------<  111<H>  3.9   |  30  |  0.62    Ca    8.8      03 Apr 2023 08:09  Phos  2.5     04-03  Mg     1.8     04-03    TPro  5.8<L>  /  Alb  2.6<L>  /  TBili  0.5  /  DBili  x   /  AST  68<H>  /  ALT  132<H>  /  AlkPhos  72  04-03    PT/INR - ( 03 Apr 2023 08:09 )   PT: 27.0 sec;   INR: 2.31 ratio      RADIOLOGY/EKG:  < from: US Kidney and Bladder (04.01.23 @ 10:10) >  IMPRESSION:  Yanes catheter within collapsed urinary bladder and diminished bilateral   hydronephrosis    < from: Xray Chest 1 View- PORTABLE-Routine (Xray Chest 1 View- PORTABLE-Routine .) (03.31.23 @ 16:53) >  IMPRESSION:  Elevated right hemidiaphragm again seen.  Right lower lung subsegmental atelectasis. Otherwise clear lungs.    < from: CT Abdomen and Pelvis No Cont (03.31.23 @ 11:48) >  IMPRESSION:  Markedly distended urinary bladder causing mild bilateral   hydroureteronephrosis.  Moderate prostatomegaly.  8mm left lower lobe pulmonary nodule. Recommend dedicated noncontrast   chest CT to assess for possible additional nodules.    < from: US Duplex Venous Lower Ext Complete, Bilateral (03.26.23 @ 13:48) >  IMPRESSION:  Extensive occlusive deep venous thrombosis in both calves, below the knee   only.  Long segment superficial venous thrombosis in the greater saphenous   bilaterally, above the knee bilaterally.    < from: CT Head No Cont (03.25.23 @ 22:42) >  Mild cerebral volume loss. Mild periventricular white matter lucencies   are nonspecific but likely sequela of microvascular ischemic disease.  IMPRESSION:  No intracranial hemorrhage, mass effect, or midline shift.

## 2023-04-03 NOTE — DISCHARGE NOTE NURSING/CASE MANAGEMENT/SOCIAL WORK - NSDCPEFALRISK_GEN_ALL_CORE
For information on Fall & Injury Prevention, visit: [email or web address] OR  [email or web address] OR  https://www.cdc.gov/steadi/patient.html

## 2023-04-11 NOTE — CDI QUERY NOTE - NSCDIOTHERTXTBX_GEN_ALL_CORE_HH
Clinical documentation indicates that this patient has CHF.  Please include more specific documentation of the acuity and, type  of Heart Failure in your Progress Note and/or Discharge Summary.      -Acute on chronic ___ CHF_ diastolic_ HFpEF  -Unable to rule out Acute on chronic ___ CHF_ diastolic_ HFpEF  -Chronic ___ CHF -_diastolic_ HFpEF  -Other (please specify)        SUPPORTING DOCUMENTATION AND/OR CLINICAL EVIDENCE:      ECHO RESULTS:< from: TTE Echo Complete w/o Contrast w/ Doppler (03.27.23 @ 14:19) > There is calcification of anterior mitral valve leaflet. The leaflet opening is normal. Mild to moderate mitral annular calcification is present. Mild (1+) mitral regurgitation is present. Significant fibrocalcific changes noted to the aortic valve leaflets with restriction in leaflet excursion. Peak and mean transaortic gradients are 106 and 67 mmHg respectively; this finding is consistent with severe aortic stenosis. ERMIAS (VTI) 0.66 cm2, AV Vmax 516 cm/s. Mild (1+) aortic regurgitation is present. Normal appearing tricuspid valve structure. Mild to Moderate Tricuspid regurgitation is present. Mild pulmonary hypertension.Normal appearing pulmonic valve structure. Mild pulmonic valvular regurgitation (1+) is present. The left atrium is moderately dilated. Estimated left ventricular ejection fraction is 60-65 %. The left ventricle is normal in size, wall motion and contractility as seen in limited views. Mild concentric left ventricular hypertrophy is present. Normal appearing right ventricle structure and function.    CT Abdomen and Pelvis No Cont (03.31.23 @ 11:48) >LOWER CHEST: Cardiomegaly, coronary artery calcifications and severe aortic valvular calcification. Trace right pleural effusion. Bilateral gynecomastia. 8 mm left lower lobe pulmonary nodule (2; 23).    Xray Chest 1 View- PORTABLE-Routine (Xray Chest 1 View- PORTABLE-Routine .) (03.31.23 @ 16:53) IMPRESSION:  Elevated right hemidiaphragm again seen.Right lower lung subsegmental atelectasis. Otherwise clear lungs.    Cardiology-3/27-Problem/Recommendation - 1:·  Problem: Severe aortic stenosis. ·  Recommendation: -Severe AS on echo (prelim).-LE edema suggests mild hypervolemia-Agree w/ diuretic therapy - daughter states pt's compliance w/ diuretics is poor.      DC summary- PMH of CHF, CKD, Afib, Severe aortic stenosis                   BNP:>2000    MEDICATIONS:furosemide    Tablet 20 milliGRAM(s) Oral (04-02-23) 20 milliGRAM(s) Oral (04-03-23)furosemide   Injectable 40 milliGRAM(s) IV Push (04-01-23)furosemide   Injectable 20 milliGRAM(s) IV Push (03-31-23)

## 2023-04-11 NOTE — CDI QUERY NOTE - NSCDIOTHERTXTBX3_GEN_ALL_CORE_FT
Could you please further clarify the type of documented Encephalopathy    -Metabolic encephalopathy superimposed on dementia  -Unable to rule out metabolic encephalopathy superimposed on dementia  -Other please specify    Chart documentation and clinical evidence-· Reason for Admission	Change in mental status      encephalopathy-#Encephalopathy #UTI UA +nitrites and WBCs; collection contaminated-c/w IV ceftriaxone-CTH: negative for ICH, mass effect or midline shift. + Mild cerebral volume loss +microvascular ischemic disease, less likely NPH -77 year old male with a PMH of CHF, CKD, Afib and Dementia presented with change in mental status, weakness and increased urination for 3-4 days.     Assessment: - AMS secondary to UTI vs. cellulitis - Leukocytosis secondary to right lower extremity cellulitis - Normocytic anemia - Bilateral DVT - Subtherapeutic INR (secondary to abx?)- Transaminitis - History of CHF, CKD, A fib, Severe aortic stenosis (followed by Dr. Bowling; scheduled for evaluation by CTsurg) multiple fall injuries and Dementia -3/31     Medicine- 3/26 to discharge-#Encephalopathy #UTI-Pt with urinary incontinence, weakness in gait and dementia-CTH: negative for ICH, mass effect or midline shift. + Mild cerebral volume loss +microvascular ischemic disease, less likely NPH -UA +nitrites and WBCs; collection contaminated-Urine culture: 10-49,000 Coag negative staph--c/w IV ceftriaxone for 1 more day    ID-1. Bilateral DVT. RLE superimposed cellulitis. Leukocytosis- imaging reviewed, wbc ct reactive- vascular eval noted- on ac- change doxycycline to vancomycin 750mg q12 check trough prior to 4th dose #2- on rocephin 1gm daily #5- continue with abx coverage- fu cultures  - no growth     DC summary-77 year old male with a PMH of CHF, CKD, Afib, Severe aortic stenosis (followed by Dr. Bowling; scheduled for evaluation by CTsurg) multiple fall injuries and Dementia presented with change in mental status, weakness and increased urination for 3-4 days. Urology was consulted. CT abdomen and

## 2023-04-11 NOTE — CDI QUERY NOTE - NSCDIOTHERTXTBX2_GEN_ALL_CORE_FT
Clinical documentation indicates that this patient has atrial fibrillation.  Please further specify:    -Chronic:  -Permanent:    -Persistent:   -Paroxysmal   -Other:    SUPPORTING DOCUMENTATION AND/OR CLINICAL EVIDENCE:    EKG:< from: 12 Lead ECG (03.25.23 @ 19:48) >Diagnosis Line Atrial fibrillation Nonspecific ST and T wave abnormalityAbnormal ECGNo previous ECGs available    DC summary-77 year old male with a PMH of CHF, CKD, Afib, Severe aortic stenosis     Medicine 4/1-#Afib  -HR controlled -EKG: AFib-rate controlled -continue Coumadin -follow INR and adjust accordingly -TTE: LVEF 60-65% with severe AS

## 2023-05-16 PROBLEM — Z78.9 OTHER SPECIFIED HEALTH STATUS: Chronic | Status: ACTIVE | Noted: 2023-03-26

## 2023-05-23 ENCOUNTER — APPOINTMENT (OUTPATIENT)
Dept: UROLOGY | Facility: CLINIC | Age: 78
End: 2023-05-23
Payer: MEDICARE

## 2023-05-23 VITALS
HEART RATE: 83 BPM | SYSTOLIC BLOOD PRESSURE: 128 MMHG | TEMPERATURE: 97.6 F | WEIGHT: 178 LBS | OXYGEN SATURATION: 96 % | HEIGHT: 71 IN | DIASTOLIC BLOOD PRESSURE: 83 MMHG | RESPIRATION RATE: 14 BRPM | BODY MASS INDEX: 24.92 KG/M2

## 2023-05-23 PROCEDURE — 99214 OFFICE O/P EST MOD 30 MIN: CPT

## 2023-05-23 NOTE — END OF VISIT
[FreeTextEntry3] : I discussed the situation in detail with pt and his daughter. Even with the catheter, he seems to produce large amounts of urine overnight. I recommended the catheter remain in dwelling without trial voids. Should his condition improve, he can come back to consider  therapy.

## 2023-05-23 NOTE — HISTORY OF PRESENT ILLNESS
[FreeTextEntry1] : 77M presents today with a CC of urinary retention. PT was hospitalized for medical problems approximately 6 weeks ago and had a catheter placed .Since that time, he has been in a rehabilitative center and has failed multiple trial voids. He did note urinary issues prior to that. PMHx includes Atrial Fibrillation, aortic stenosis, hypothyroidism, ataxia, dementia. PSHx includes cardiac surgery, hemithyroidectomy. Medications include Lasix, Flomax, Spironolactone, Levothyroxine, Coumadin, Potassium supplementation. Family history shows no HTN in mother or father. Tobacco and ETOH use are both denies. Pt is wheelchair bound.

## 2023-05-23 NOTE — PHYSICAL EXAM
[General Appearance - Well Developed] : well developed [General Appearance - Well Nourished] : well nourished [Normal Appearance] : normal appearance [Well Groomed] : well groomed [General Appearance - In No Acute Distress] : no acute distress [Edema] : no peripheral edema [Respiration, Rhythm And Depth] : normal respiratory rhythm and effort [Exaggerated Use Of Accessory Muscles For Inspiration] : no accessory muscle use [Abdomen Soft] : soft [Abdomen Tenderness] : non-tender [Costovertebral Angle Tenderness] : no ~M costovertebral angle tenderness [Urethral Meatus] : meatus normal [Urinary Bladder Findings] : the bladder was normal on palpation [Scrotum] : the scrotum was normal [Testes Mass (___cm)] : there were no testicular masses [No Prostate Nodules] : no prostate nodules [FreeTextEntry1] : Henri in dwelling.  [Normal Station and Gait] : the gait and station were normal for the patient's age [] : no rash [No Focal Deficits] : no focal deficits [Oriented To Time, Place, And Person] : oriented to person, place, and time [Affect] : the affect was normal [Not Anxious] : not anxious [Mood] : the mood was normal [No Palpable Adenopathy] : no palpable adenopathy

## 2023-05-24 ENCOUNTER — NON-APPOINTMENT (OUTPATIENT)
Age: 78
End: 2023-05-24

## 2023-05-25 ENCOUNTER — NON-APPOINTMENT (OUTPATIENT)
Age: 78
End: 2023-05-25

## 2023-05-25 RX ORDER — BETHANECHOL CHLORIDE 50 MG/1
50 TABLET ORAL
Qty: 60 | Refills: 1 | Status: ACTIVE | OUTPATIENT
Start: 2023-05-25

## 2023-06-26 ENCOUNTER — APPOINTMENT (OUTPATIENT)
Dept: UROLOGY | Facility: CLINIC | Age: 78
End: 2023-06-26
Payer: MEDICARE

## 2023-06-26 VITALS
RESPIRATION RATE: 16 BRPM | BODY MASS INDEX: 24.92 KG/M2 | HEIGHT: 71 IN | WEIGHT: 178 LBS | HEART RATE: 74 BPM | SYSTOLIC BLOOD PRESSURE: 115 MMHG | DIASTOLIC BLOOD PRESSURE: 63 MMHG | OXYGEN SATURATION: 97 %

## 2023-06-26 DIAGNOSIS — R33.9 RETENTION OF URINE, UNSPECIFIED: ICD-10-CM

## 2023-06-26 PROCEDURE — 51797 INTRAABDOMINAL PRESSURE TEST: CPT | Mod: TC

## 2023-06-26 PROCEDURE — 51741 ELECTRO-UROFLOWMETRY FIRST: CPT

## 2023-06-26 PROCEDURE — 51728 CYSTOMETROGRAM W/VP: CPT | Mod: TC

## 2023-06-26 PROCEDURE — 51741 ELECTRO-UROFLOWMETRY FIRST: CPT | Mod: TC

## 2023-06-26 PROCEDURE — 51784 ANAL/URINARY MUSCLE STUDY: CPT

## 2023-06-26 PROCEDURE — 51784 ANAL/URINARY MUSCLE STUDY: CPT | Mod: TC

## 2023-06-26 PROCEDURE — 51797 INTRAABDOMINAL PRESSURE TEST: CPT

## 2023-06-26 PROCEDURE — 51728 CYSTOMETROGRAM W/VP: CPT

## 2023-07-01 PROBLEM — R33.9 RETENTION, URINE: Status: ACTIVE | Noted: 2023-05-23

## 2024-03-05 NOTE — PHYSICAL THERAPY INITIAL EVALUATION ADULT - TRANSFER TRAINING, PT EVAL
All other review of systems negative, except as noted in HPI
The pt will be able to perform all transfer training activities independently by time of discharge from acute care PT program.